# Patient Record
Sex: FEMALE | Race: WHITE | HISPANIC OR LATINO | Employment: FULL TIME | URBAN - METROPOLITAN AREA
[De-identification: names, ages, dates, MRNs, and addresses within clinical notes are randomized per-mention and may not be internally consistent; named-entity substitution may affect disease eponyms.]

---

## 2017-03-15 ENCOUNTER — TELEPHONE (OUTPATIENT)
Dept: PEDIATRICS | Facility: CLINIC | Age: 47
End: 2017-03-15

## 2017-03-15 DIAGNOSIS — A60.00 RECURRENT GENITAL HERPES: ICD-10-CM

## 2017-03-15 RX ORDER — ACYCLOVIR 400 MG/1
400 TABLET ORAL 3 TIMES DAILY
Qty: 15 TABLET | Refills: 3 | Status: SHIPPED | OUTPATIENT
Start: 2017-03-15 | End: 2018-05-10

## 2017-03-15 NOTE — TELEPHONE ENCOUNTER
acyclovir     Last Written Prescription Date: 12/19/16  Last Fill Quantity: 15, # refills: 3  Last Office Visit with [facility], [facility] or [facility] prescribing provider: 12/19/16        Creatinine   Date Value Ref Range Status   11/17/2016 0.69 0.52 - 1.04 mg/dL Final     Medication filled per protocol.      Snehal Vernon RN, [facility]

## 2017-07-03 DIAGNOSIS — F41.1 GAD (GENERALIZED ANXIETY DISORDER): ICD-10-CM

## 2017-07-03 NOTE — TELEPHONE ENCOUNTER
FLUoxetine HCl, PMDD, 20 MG CAPS  Last Written Prescription Date: 12/19/16  Last Fill Quantity: 90 capsules; # refills: 1  Last Office Visit with [facility], [facility] or [facility] prescribing provider:  12/19/16       Last PHQ-9 score on record=   PHQ-9 SCORE 8/17/2015   Total Score 3       Lab Results   Component Value Date    AST 23 04/13/2016     Lab Results   Component Value Date    ALT 46 04/13/2016     MG refill brandon [facility]

## 2018-01-21 ENCOUNTER — HEALTH MAINTENANCE LETTER (OUTPATIENT)
Age: 48
End: 2018-01-21

## 2018-05-10 DIAGNOSIS — A60.00 RECURRENT GENITAL HERPES: ICD-10-CM

## 2018-05-10 NOTE — LETTER
May 15, 2018      Domitila Garduno  [address] [facility] [address]              Dear Domitila,      We recently received a call from your pharmacy requesting a refill of your medication. We have provided a 30 day refill of your medication, acyclovir (ZOVIRAX) 400 MG tablet, as requested.      A review of your chart indicates that your last office visit was on 12/19/2016 with Dr. Ko.  An appointment is required yearly with your provider.    We have authorized a one time refill of your medication to allow time for you to schedule.     If you have a history of diabetes or high cholesterol, please come in fasting for the appointment. Fasting entails nothing to eat or drink 8 hours prior to your appointment; with the exception on water. You may take your medication the day of the appointment.    Please call the clinic to schedule your appointment.    Thank you for taking an active role in your healthcare.      Sincerely,     [facility]

## 2018-05-15 RX ORDER — ACYCLOVIR 400 MG/1
TABLET ORAL
Qty: 15 TABLET | Refills: 0 | Status: SHIPPED | OUTPATIENT
Start: 2018-05-15 | End: 2023-05-03

## 2018-05-15 NOTE — TELEPHONE ENCOUNTER
acyclovir (ZOVIRAX) 400 MG tablet 15 tablet 3 3/15/2017  No   Sig: Take 1 tablet (400 mg) by mouth 3 times daily     Last OV with Dr. Ko: 12/19/2016    No future apts.     Creatinine   Date Value Ref Range Status   11/17/2016 0.69 0.52 - 1.04 mg/dL Final     Antivirals for Herpes Protocol Failed5/10 11:05 AM   Recent (12 mo) or future (30 days) visit within the authorizing provider's specialty    Normal serum creatinine on file in past 12 months    Patient is age 12 or older     Patient due for annual physical and labs. [facility] refill. Patient notified by letter. Christiane Thapa RN

## 2019-03-04 ENCOUNTER — TELEPHONE (OUTPATIENT)
Dept: SURGERY | Facility: CLINIC | Age: 49
End: 2019-03-04

## 2019-03-04 NOTE — TELEPHONE ENCOUNTER
Reason for call:  Other   Patient called regarding (reason for call): needs a new card  Additional comments: Patient lost her meal card for when she goes to restaurants. She would like a new one mailed to her as soon as possible, to the address on file.    Phone number to reach patient:  Home number on file [phone] (home)    Best Time:  Anytime    Can we leave a detailed message on this number?  Not Applicable

## 2020-02-23 ENCOUNTER — HEALTH MAINTENANCE LETTER (OUTPATIENT)
Age: 50
End: 2020-02-23

## 2020-12-06 ENCOUNTER — HEALTH MAINTENANCE LETTER (OUTPATIENT)
Age: 50
End: 2020-12-06

## 2021-04-11 ENCOUNTER — HEALTH MAINTENANCE LETTER (OUTPATIENT)
Age: 51
End: 2021-04-11

## 2021-09-26 ENCOUNTER — HEALTH MAINTENANCE LETTER (OUTPATIENT)
Age: 51
End: 2021-09-26

## 2022-05-05 LAB — PAP-ABSTRACT: NORMAL

## 2022-05-07 ENCOUNTER — HEALTH MAINTENANCE LETTER (OUTPATIENT)
Age: 52
End: 2022-05-07

## 2022-11-01 LAB — HEMOCCULT STL QL IA: NEGATIVE

## 2022-11-21 ENCOUNTER — TRANSFERRED RECORDS (OUTPATIENT)
Dept: MULTI SPECIALTY CLINIC | Facility: CLINIC | Age: 52
End: 2022-11-21

## 2023-01-08 ENCOUNTER — HEALTH MAINTENANCE LETTER (OUTPATIENT)
Age: 53
End: 2023-01-08

## 2023-04-17 ENCOUNTER — OFFICE VISIT (OUTPATIENT)
Dept: FAMILY MEDICINE | Facility: CLINIC | Age: 53
End: 2023-04-17
Payer: COMMERCIAL

## 2023-04-17 VITALS
WEIGHT: 163.9 LBS | TEMPERATURE: 98.4 F | DIASTOLIC BLOOD PRESSURE: 90 MMHG | SYSTOLIC BLOOD PRESSURE: 160 MMHG | HEART RATE: 51 BPM | BODY MASS INDEX: 27.98 KG/M2 | OXYGEN SATURATION: 97 % | HEIGHT: 64 IN

## 2023-04-17 DIAGNOSIS — Z11.59 NEED FOR HEPATITIS C SCREENING TEST: ICD-10-CM

## 2023-04-17 DIAGNOSIS — G47.33 OSA (OBSTRUCTIVE SLEEP APNEA): ICD-10-CM

## 2023-04-17 DIAGNOSIS — Z86.79 HISTORY OF HYPERTENSION: ICD-10-CM

## 2023-04-17 DIAGNOSIS — Z86.69 HX OF CARPAL TUNNEL SYNDROME: ICD-10-CM

## 2023-04-17 DIAGNOSIS — Z98.84 S/P GASTRIC BYPASS: ICD-10-CM

## 2023-04-17 DIAGNOSIS — R42 DIZZINESS: Primary | ICD-10-CM

## 2023-04-17 LAB
ALBUMIN SERPL-MCNC: 3.6 G/DL (ref 3.4–5)
ALP SERPL-CCNC: 96 U/L (ref 40–150)
ALT SERPL W P-5'-P-CCNC: 76 U/L (ref 0–50)
ANION GAP SERPL CALCULATED.3IONS-SCNC: 3 MMOL/L (ref 3–14)
AST SERPL W P-5'-P-CCNC: 45 U/L (ref 0–45)
BILIRUB SERPL-MCNC: 0.3 MG/DL (ref 0.2–1.3)
BUN SERPL-MCNC: 14 MG/DL (ref 7–30)
CALCIUM SERPL-MCNC: 9 MG/DL (ref 8.5–10.1)
CHLORIDE BLD-SCNC: 105 MMOL/L (ref 94–109)
CO2 SERPL-SCNC: 29 MMOL/L (ref 20–32)
CREAT SERPL-MCNC: 0.7 MG/DL (ref 0.52–1.04)
CREAT UR-MCNC: 58 MG/DL
ERYTHROCYTE [DISTWIDTH] IN BLOOD BY AUTOMATED COUNT: 12.3 % (ref 10–15)
FERRITIN SERPL-MCNC: 45 NG/ML (ref 8–252)
GFR SERPL CREATININE-BSD FRML MDRD: >90 ML/MIN/1.73M2
GLUCOSE BLD-MCNC: 100 MG/DL (ref 70–99)
HCT VFR BLD AUTO: 41.2 % (ref 35–47)
HGB BLD-MCNC: 13.5 G/DL (ref 11.7–15.7)
MCH RBC QN AUTO: 29.6 PG (ref 26.5–33)
MCHC RBC AUTO-ENTMCNC: 32.8 G/DL (ref 31.5–36.5)
MCV RBC AUTO: 90 FL (ref 78–100)
MICROALBUMIN UR-MCNC: <5 MG/L
MICROALBUMIN/CREAT UR: NORMAL MG/G{CREAT}
PLATELET # BLD AUTO: 258 10E3/UL (ref 150–450)
POTASSIUM BLD-SCNC: 4 MMOL/L (ref 3.4–5.3)
PROT SERPL-MCNC: 7.2 G/DL (ref 6.8–8.8)
RBC # BLD AUTO: 4.56 10E6/UL (ref 3.8–5.2)
SODIUM SERPL-SCNC: 137 MMOL/L (ref 133–144)
TSH SERPL DL<=0.005 MIU/L-ACNC: 2.08 MU/L (ref 0.4–4)
VIT B12 SERPL-MCNC: 1340 PG/ML (ref 232–1245)
WBC # BLD AUTO: 5.7 10E3/UL (ref 4–11)

## 2023-04-17 PROCEDURE — 82043 UR ALBUMIN QUANTITATIVE: CPT | Performed by: FAMILY MEDICINE

## 2023-04-17 PROCEDURE — 90715 TDAP VACCINE 7 YRS/> IM: CPT | Performed by: FAMILY MEDICINE

## 2023-04-17 PROCEDURE — 80053 COMPREHEN METABOLIC PANEL: CPT | Performed by: FAMILY MEDICINE

## 2023-04-17 PROCEDURE — 85027 COMPLETE CBC AUTOMATED: CPT | Performed by: FAMILY MEDICINE

## 2023-04-17 PROCEDURE — 99204 OFFICE O/P NEW MOD 45 MIN: CPT | Mod: 25 | Performed by: FAMILY MEDICINE

## 2023-04-17 PROCEDURE — 82607 VITAMIN B-12: CPT | Performed by: FAMILY MEDICINE

## 2023-04-17 PROCEDURE — 36415 COLL VENOUS BLD VENIPUNCTURE: CPT | Performed by: FAMILY MEDICINE

## 2023-04-17 PROCEDURE — 82570 ASSAY OF URINE CREATININE: CPT | Performed by: FAMILY MEDICINE

## 2023-04-17 PROCEDURE — 82306 VITAMIN D 25 HYDROXY: CPT | Performed by: FAMILY MEDICINE

## 2023-04-17 PROCEDURE — 90471 IMMUNIZATION ADMIN: CPT | Performed by: FAMILY MEDICINE

## 2023-04-17 PROCEDURE — 82728 ASSAY OF FERRITIN: CPT | Performed by: FAMILY MEDICINE

## 2023-04-17 PROCEDURE — 84443 ASSAY THYROID STIM HORMONE: CPT | Performed by: FAMILY MEDICINE

## 2023-04-17 PROCEDURE — 86803 HEPATITIS C AB TEST: CPT | Performed by: FAMILY MEDICINE

## 2023-04-17 NOTE — Clinical Note
Please abstract the following data from this visit with this patient into the appropriate field in Epic:  Tests that can be patient reported without a hard copy:  Mammogram done on this date: 11/4/22 (approximately), by this group: [facility], results were negative. , Pap smear done on this date: 5/5/2022 (approximately), by this group: [facility], results were negative and normal. , and stool fit testing-11/1/22-negative  Other Tests found in the patient's chart through Chart Review/Care Everywhere:    Note to Abstraction: If this section is blank, no results were found via Chart Review/Care Everywhere.

## 2023-04-17 NOTE — PROGRESS NOTES
Assessment & Plan     Dizziness  BP Readings from Last 6 Encounters:   04/17/23 (!) 160/90   12/19/16 124/77   09/29/16 113/65   08/18/16 122/78   07/08/16 124/78   05/27/16 116/76     Initial blood pressures were 154/93, 161/91.  Rechecked-160/90  I suspect her current dizziness could be from underlying hypertension  Patient had history of hypertension and hyperlipidemia prior to her gastric bypass surgery and was on treatment with diuretic for hypertension  This was stopped after her significant weight loss following the gastric bypass in 2015  She is currently not checking blood pressures  We will check labs today including vitamin B12 that she is not taking currently due to over replacement of vitamin B12 as of outside labs from 11/2022  Recommended to start taking blood pressure 1-2 times a day for the next 2 weeks, follow-up in 2 weeks at the time of physical  Consider starting on antihypertensives  for persistent or worsening concerns  Will follow low salt diet, continue with weight loss efforts  and regular exercises.  Patient verbalised understanding and is agreeable to the plan.      - CBC with platelets; Future  - Comprehensive metabolic panel (BMP + Alb, Alk Phos, ALT, AST, Total. Bili, TP); Future  - Ferritin; Future  - Vitamin B12; Future  - TSH with free T4 reflex; Future  - CBC with platelets  - Comprehensive metabolic panel (BMP + Alb, Alk Phos, ALT, AST, Total. Bili, TP)  - Ferritin  - Vitamin B12  - TSH with free T4 reflex    History of hypertension  as above    - CBC with platelets; Future  - Comprehensive metabolic panel (BMP + Alb, Alk Phos, ALT, AST, Total. Bili, TP); Future  - Albumin Random Urine Quantitative with Creat Ratio; Future  - TSH with free T4 reflex; Future  - CBC with platelets  - Comprehensive metabolic panel (BMP + Alb, Alk Phos, ALT, AST, Total. Bili, TP)  - Albumin Random Urine Quantitative with Creat Ratio  - TSH with free T4 reflex    S/P gastric bypass  as above  Patient  "is currently taking vitamin D 4000 units daily, and iron supplements-2 tablets a day  - CBC with platelets; Future  - Ferritin; Future  - Vitamin B12; Future  - Vitamin D Deficiency; Future  - TSH with free T4 reflex; Future  - CBC with platelets  - Ferritin  - Vitamin B12  - Vitamin D Deficiency  - TSH with free T4 reflex    Need for hepatitis C screening test    - Hepatitis C Screen Reflex to HCV RNA Quant and Genotype; Future  - Hepatitis C Screen Reflex to HCV RNA Quant and Genotype    KANDIS (obstructive sleep apnea)  Patient reported using a mouthpiece      Hx of carpal tunnel syndrome  Comment:   Plan: Orthopedic  Referral        If B12 levels are in normal range, will call to schedule for sports medicine consult for evaluation for carpal tunnel syndrome          Review of the result(s) of each unique test - Mammogram from #2022, Pap smear from 5/2022, negative FIT from 112022 through Care Everywhere         BMI:   Estimated body mass index is 28.13 kg/m  as calculated from the following:    Height as of this encounter: 1.626 m (5' 4\").    Weight as of this encounter: 74.3 kg (163 lb 14.4 oz).   Weight management plan: Continue with weight loss efforts, healthy eating and regular exercises    Regular exercise  Chart documentation done in part with Dragon Voice recognition Software. Although reviewed after completion, some word and grammatical error may remain.    See Patient Instructions    Marcos Ko MD  [facility]   Domitila is a 53 year old, presenting for the following health issues:  Patient is here to reestablish care with this writer after recent change in insurance  Patient was last seen by me in 2016  She was being followed up with internal medicine at [facility] up until recent insurance change  Patient is here with concerns of having ongoing intermittent episodes of dizziness for the past few months associated with fatigue  She " has a history of status post gastric bypass in 2015 and abdominal wall plasty in [place] in December 2022  Patient has been taking over-the-counter iron and vitamin D supplements   She stopped her vitamin B12 supplements after having an elevated B12 lab marker from November 2022  Patient has been noticing intermittent episodes of tingling and numbness of both upper extremities and is wondering about carpal tunnel syndrome,  Denies personal history of anemia, thyroid disorder  Had history of type 2 diabetes before her gastric bypass but not anymore  Patient had history of hypertension before her gastric bypass surgery, was on Tenoretic  After significant weight loss following surgery, her statin and antihypertensives were discontinued   Denies chest pain, SOB, edema legs, visual concerns, focal neurological symptoms, syncope, palpitations.  She does have underlying history of sleep apnea, last sleep study was last year, is currently using mouthpiece  Denies abnormal bleeding problems, LMP-2 months ago      Referral (For carpal tunnel ), Fatigue, and Dizziness        4/17/2023     2:05 PM   Additional Questions   Roomed by Naima   Accompanied by Self         4/17/2023     2:05 PM   Patient Reported Additional Medications   Patient reports taking the following new medications None     History of Present Illness       Reason for visit:  [place], Blood pressure,referral to hand specialis  Symptom onset:  More than a month  Symptoms include:  Blood pressuru, al tireness,needreferall t hand specialist  Symptom intensity:  Moderate  Symptom progression:  Worsening  Had these symptoms before:  Yes  Has tried/received treatment for these symptoms:  Yes  Previous treatment was successful:  No  What makes it worse:  Lack of sleep  What makes it better:  Rest    She eats 4 or more servings of fruits and vegetables daily.She consumes 6 sweetened beverage(s) daily.She exercises with enough effort to increase her heart rate 30 to  "60 minutes per day.  She exercises with enough effort to increase her heart rate 3 or less days per week.   She is taking medications regularly.       Dizziness  Onset/Duration: on and off for a few months  Description:   Do you feel faint: No  Does it feel like the surroundings (bed, room) are moving: No  Unsteady/off balance: No  Have you passed out or fallen: No  Intensity: mild  Progression of Symptoms: same  Accompanying Signs & Symptoms:  Heart palpitations or chest pain: unsure  Nausea, vomiting: No  Weakness or lack of coordination in arms or legs: No  Vision or speech changes: YES  Numbness or tingling: YES- arms  Ringing in ears (Tinnitus): No  Hearing Loss: No  History:   Head trauma/concussion history: YES  Previous similar symptoms: No  Recent bleeding history: No  Any new medications (BP?): No  Precipitating factors:   Worse with activity: YES, when things around her are moving  Worse with head movement: No  Alleviating factors:   Does staying in a fixed position give relief: YES  Therapies tried and outcome: None          Review of Systems   CONSTITUTIONAL: NEGATIVE for fever, chills, change in weight  INTEGUMENTARY/SKIN: NEGATIVE for worrisome rashes, moles or lesions  EYES: NEGATIVE for vision changes or irritation  RESP: NEGATIVE for significant cough or SOB  CV: NEGATIVE for chest pain, palpitations or peripheral edema  CV: History of hypertension in the past  GI: NEGATIVE for nausea, abdominal pain, heartburn, or change in bowel habits  S/p gastric bypass  : Perimenopausal  MUSCULOSKELETAL: NEGATIVE for significant arthralgias or myalgia  NEURO: Tingling and numbness of upper extremities  ENDOCRINE: NEGATIVE for temperature intolerance, skin/hair changes  HEME/ALLERGY/IMMUNE: NEGATIVE for bleeding problems  PSYCHIATRIC: NEGATIVE for changes in mood or affect      Objective    BP (!) 160/90   Pulse 51   Temp 98.4  F (36.9  C) (Oral)   Ht 1.626 m (5' 4\")   Wt 74.3 kg (163 lb 14.4 oz)   SpO2 " 97%   BMI 28.13 kg/m    Body mass index is 28.13 kg/m .  Physical Exam   GENERAL: healthy, alert and no distress  EYES: Eyes grossly normal to inspection  HENT: oropharynx clear and oral mucous membranes moist  RESP: lungs clear to auscultation - no rales, rhonchi or wheezes  CV: regular rate and rhythm, normal S1 S2, no S3 or S4, no murmur, click or rub, no peripheral edema and peripheral pulses strong  MS: no gross musculoskeletal defects noted, no edema  NEURO: Normal strength and tone, mentation intact and speech normal  PSYCH: mentation appears normal, affect normal/bright    Labs-in process

## 2023-04-17 NOTE — NURSING NOTE
Prior to immunization administration, verified patients identity using patient s name and date of birth. Please see Immunization Activity for additional information. Yes    Screening Questionnaire for Adult Immunization    Are you sick today?   No   Do you have allergies to medications, food, a vaccine component or latex?   No   Have you ever had a serious reaction after receiving a vaccination?   No   Do you have a long-term health problem with heart, lung, kidney, or metabolic disease (e.g., diabetes), asthma, a blood disorder, no spleen, complement component deficiency, a cochlear implant, or a spinal fluid leak?  Are you on long-term aspirin therapy?   No   Do you have cancer, leukemia, HIV/AIDS, or any other immune system problem?   No   Do you have a parent, brother, or sister with an immune system problem?   No   In the past 3 months, have you taken medications that affect  your immune system, such as prednisone, other steroids, or anticancer drugs; drugs for the treatment of rheumatoid arthritis, Crohn s disease, or psoriasis; or have you had radiation treatments?   No   Have you had a seizure, or a brain or other nervous system problem?   No   During the past year, have you received a transfusion of blood or blood    products, or been given immune (gamma) globulin or antiviral drug?   No   For women: Are you pregnant or is there a chance you could become       pregnant during the next month?   No   Have you received any vaccinations in the past 4 weeks?   No     Immunization questionnaire answers were all negative.      Injection of TDAP given by Diane L. Schoenherr, RN. Patient instructed to remain in clinic for 15 minutes afterwards, and to report any adverse reactions.     Screening performed by Diane L. Schoenherr, RN on 4/17/2023 at 2:46 PM.

## 2023-04-18 LAB
DEPRECATED CALCIDIOL+CALCIFEROL SERPL-MC: 81 UG/L (ref 20–75)
HCV AB SERPL QL IA: NONREACTIVE

## 2023-04-19 NOTE — RESULT ENCOUNTER NOTE
Mahendra Ramesh,  Your recent labs showed normal blood counts and hemoglobin with no concern for anemia, normal iron levels, thyroid functions, urine exam with no protein leak and negative hepatitis C screening test.  These are good and reassuring  Your vitamin B12 continues to be moderately elevated, hold your B12 supplements for another 6 months  Your vitamin D levels are also in excess, decrease your vitamin D to 50% of what you have been taking now   Let me know if you have any questions. Take care.  Marcos Ko MD

## 2023-04-21 NOTE — TELEPHONE ENCOUNTER
DIAGNOSIS: bilat CTS   APPOINTMENT DATE: 05/05/2023   NOTES STATUS DETAILS   OFFICE NOTE from referring provider Internal 04/17/2023 Dr Ko [facility]    OFFICE NOTE from other specialist N/A    DISCHARGE SUMMARY from hospital N/A    DISCHARGE REPORT from the ER N/A    OPERATIVE REPORT N/A    EMG report N/A    MEDICATION LIST N/A    MRI N/A    DEXA (osteoporosis/bone health) N/A    CT SCAN N/A    XRAYS (IMAGES & REPORTS) N/A

## 2023-05-03 ENCOUNTER — OFFICE VISIT (OUTPATIENT)
Dept: FAMILY MEDICINE | Facility: CLINIC | Age: 53
End: 2023-05-03
Payer: COMMERCIAL

## 2023-05-03 ENCOUNTER — LAB (OUTPATIENT)
Dept: FAMILY MEDICINE | Facility: CLINIC | Age: 53
End: 2023-05-03

## 2023-05-03 VITALS
OXYGEN SATURATION: 98 % | BODY MASS INDEX: 28.27 KG/M2 | RESPIRATION RATE: 15 BRPM | SYSTOLIC BLOOD PRESSURE: 116 MMHG | WEIGHT: 164.7 LBS | DIASTOLIC BLOOD PRESSURE: 80 MMHG | HEART RATE: 60 BPM | TEMPERATURE: 96.8 F

## 2023-05-03 DIAGNOSIS — G43.709 CHRONIC MIGRAINE WITHOUT AURA WITHOUT STATUS MIGRAINOSUS, NOT INTRACTABLE: ICD-10-CM

## 2023-05-03 DIAGNOSIS — Z13.220 SCREENING FOR HYPERLIPIDEMIA: ICD-10-CM

## 2023-05-03 DIAGNOSIS — Z98.84 S/P GASTRIC BYPASS: ICD-10-CM

## 2023-05-03 DIAGNOSIS — Z12.11 COLON CANCER SCREENING: ICD-10-CM

## 2023-05-03 DIAGNOSIS — G47.33 OSA (OBSTRUCTIVE SLEEP APNEA): ICD-10-CM

## 2023-05-03 DIAGNOSIS — F41.1 GAD (GENERALIZED ANXIETY DISORDER): ICD-10-CM

## 2023-05-03 DIAGNOSIS — Z12.4 CERVICAL CANCER SCREENING: ICD-10-CM

## 2023-05-03 DIAGNOSIS — R53.82 CHRONIC FATIGUE: ICD-10-CM

## 2023-05-03 DIAGNOSIS — R25.1 TREMOR: ICD-10-CM

## 2023-05-03 DIAGNOSIS — Z00.00 ROUTINE GENERAL MEDICAL EXAMINATION AT A HEALTH CARE FACILITY: Primary | ICD-10-CM

## 2023-05-03 DIAGNOSIS — R07.89 CHEST PRESSURE: ICD-10-CM

## 2023-05-03 DIAGNOSIS — Z86.69 HX OF CARPAL TUNNEL SYNDROME: ICD-10-CM

## 2023-05-03 DIAGNOSIS — Z86.79 HISTORY OF HYPERTENSION: ICD-10-CM

## 2023-05-03 DIAGNOSIS — Z12.31 ENCOUNTER FOR SCREENING MAMMOGRAM FOR MALIGNANT NEOPLASM OF BREAST: ICD-10-CM

## 2023-05-03 DIAGNOSIS — A60.00 RECURRENT GENITAL HERPES: ICD-10-CM

## 2023-05-03 PROCEDURE — 93000 ELECTROCARDIOGRAM COMPLETE: CPT | Performed by: FAMILY MEDICINE

## 2023-05-03 PROCEDURE — 99396 PREV VISIT EST AGE 40-64: CPT | Performed by: FAMILY MEDICINE

## 2023-05-03 PROCEDURE — 99214 OFFICE O/P EST MOD 30 MIN: CPT | Mod: 25 | Performed by: FAMILY MEDICINE

## 2023-05-03 RX ORDER — PROPRANOLOL HYDROCHLORIDE 20 MG/1
20 TABLET ORAL
Qty: 90 TABLET | Refills: 3 | Status: SHIPPED | OUTPATIENT
Start: 2023-05-03 | End: 2023-12-13

## 2023-05-03 RX ORDER — SUMATRIPTAN 25 MG/1
25 TABLET, FILM COATED ORAL
Qty: 18 TABLET | Refills: 1 | Status: SHIPPED | OUTPATIENT
Start: 2023-05-03 | End: 2023-12-13

## 2023-05-03 RX ORDER — VALACYCLOVIR HYDROCHLORIDE 500 MG/1
500 TABLET, FILM COATED ORAL DAILY
Qty: 90 TABLET | Refills: 3 | Status: SHIPPED | OUTPATIENT
Start: 2023-05-03 | End: 2023-11-08

## 2023-05-03 RX ORDER — FAMOTIDINE 20 MG
2000 TABLET ORAL EVERY MORNING
COMMUNITY
Start: 2023-05-03

## 2023-05-03 ASSESSMENT — ENCOUNTER SYMPTOMS
PARESTHESIAS: 0
DIZZINESS: 1
BREAST MASS: 0
FREQUENCY: 0
MYALGIAS: 0
DYSURIA: 0
HEARTBURN: 0
HEADACHES: 1
COUGH: 0
FEVER: 0
HEMATOCHEZIA: 0
PALPITATIONS: 0
JOINT SWELLING: 1
HEMATURIA: 0
DIARRHEA: 0
NAUSEA: 0
SHORTNESS OF BREATH: 1
SORE THROAT: 0
NERVOUS/ANXIOUS: 0
CHILLS: 0
ABDOMINAL PAIN: 0
CONSTIPATION: 0
ARTHRALGIAS: 1
EYE PAIN: 0

## 2023-05-03 ASSESSMENT — PAIN SCALES - GENERAL: PAINLEVEL: NO PAIN (0)

## 2023-05-03 NOTE — PROGRESS NOTES
Answers for HPI/ROS submitted by the patient on 5/3/2023  Frequency of exercise:: 4-5 days/week  Getting at least 3 servings of Calcium per day:: Yes  Diet:: Low salt  Taking medications regularly:: Yes  Medication side effects:: None  Bi-annual eye exam:: Yes  Dental care twice a year:: Yes  Sleep apnea or symptoms of sleep apnea:: Sleep apnea  abdominal pain: No  Blood in stool: No  Blood in urine: No  chest pain: No  chills: No  congestion: No  constipation: No  cough: No  diarrhea: No  dizziness: Yes  ear pain: No  eye pain: No  nervous/anxious: No  fever: No  frequency: No  genital sores: No  headaches: Yes  hearing loss: No  heartburn: No  arthralgias: Yes  joint swelling: Yes  mood changes: No  myalgias: No  nausea: No  dysuria: No  palpitations: No  Skin sensation changes: No  sore throat: No  urgency: No  rash: No  shortness of breath: Yes  pelvic pain: No  vaginal discharge: No  tenderness: No  breast mass: No  breast discharge: No  Additional concerns today:: Yes  Duration of exercise:: 45-60 minutes       SUBJECTIVE:   CC: Domitila is an 53 year old who presents for preventive health visit.     Patient is here for annual physical and with other concerns as mentioned below  Chest pressure-patient had history of hypertension in the past, was on antihypertensives but was stopped after she had her gastric bypass surgery  At her last visit 2 weeks ago, patient's blood pressure was found to be significantly elevated she has been checking blood pressures at home,most of which are moderately elevated  Patient is also concerned with having concerns for headache, mild dizziness and chest pressure when she notes elevated blood pressures  Today's blood pressure in the office is within normal range  Patient is using a wrist monitor, which showed 122/83 at home this morning at 8:00 and 147/93 during the office visit today  Patient denies having dyspnea on exertion, shortness of breath, GERD when she feels chest  pressure.  She continues to worry about having hypertension  Chest pressure last for few seconds, resolves on its own  Denies chest pain  Patient does not smoke, does not use alcohol, recreational drugs  Has family history of heart disease  Patient does not have any limitation of routine activities because of the symptoms  Past medical history significant for status post gastric bypass, obstructive sleep apnea, carpal tunnel syndrome, recurrent genital herpes, anxiety, chronic migraine headaches, tremor  Patient is also concerned with ongoing unexplained fatigue for many years  It was noted that patient has been taking propanolol 20 mg daily once in the morning for her anxiety and tremor  Patient recently had labs done which showed no concerns for iron deficiency, anemia, vitamin deficiencies      4/17/2023     2:05 PM   Additional Questions   Roomed by Naima   Accompanied by Self   Patient has been advised of split billing requirements and indicates understanding: Yes  Healthy Habits:     Getting at least 3 servings of Calcium per day:  Yes    Bi-annual eye exam:  Yes    Dental care twice a year:  Yes    Sleep apnea or symptoms of sleep apnea:  Sleep apnea    Diet:  Low salt    Frequency of exercise:  4-5 days/week    Duration of exercise:  45-60 minutes    Taking medications regularly:  Yes    Medication side effects:  None    PHQ-2 Total Score: 0    Additional concerns today:  Yes          Medication Followup of Valtrex for recurrent genital herpes, propranolol for anxiety and tremor    Taking Medication as prescribed: yes    Side Effects:  None    Medication Helping Symptoms:  yes    Anxiety Follow-Up    How are you doing with your anxiety since your last visit? No change    Are you having other symptoms that might be associated with anxiety? No    Have you had a significant life event? No     Are you feeling depressed? No    Do you have any concerns with your use of alcohol or other drugs? No    Social History      Tobacco Use     Smoking status: Never     Smokeless tobacco: Never   Vaping Use     Vaping status: Never Used   Substance Use Topics     Alcohol use: No     Drug use: No         2/24/2014     9:14 AM 8/17/2015     1:57 PM   TERRANCE-7 SCORE   Total Score 3 3          View : No data to display.                  Migraine     Since your last clinic visit, how have your headaches changed?  No change    How often are you getting headaches or migraines?  Rarely few times a year,    Are you able to do normal daily activities when you have a migraine? Yes    Are you taking rescue/relief medications? (Select all that apply) sumatriptan (Imitrex)    How helpful is your rescue/relief medication?  I get total relief    Are you taking any medications to prevent migraines? (Select all that apply)  Inderal    In the past 4 weeks, how often have you gone to urgent care or the emergency room because of your headaches?  0    Pt noticed discomforting pressure on chest for a few weeks, pt would like to discuss.    Today's PHQ-2 Score:       5/3/2023     9:08 AM   PHQ-2 ( 1999 [employer])   Q1: Little interest or pleasure in doing things 0   Q2: Feeling down, depressed or hopeless 0   PHQ-2 Score 0   Q1: Little interest or pleasure in doing things Not at all   Q2: Feeling down, depressed or hopeless Not at all   PHQ-2 Score 0       Have you ever done Advance Care Planning? (For example, a Health Directive, POLST, or a discussion with a medical provider or your loved ones about your wishes): No, advance care planning information given to patient to review.  Patient plans to discuss their wishes with loved ones or provider.      Social History     Tobacco Use     Smoking status: Never     Smokeless tobacco: Never   Vaping Use     Vaping status: Never Used   Substance Use Topics     Alcohol use: No             5/3/2023     9:08 AM   Alcohol Use   Prescreen: >3 drinks/day or >7 drinks/week? No          View : No data to display.               Reviewed orders with patient.  Reviewed health maintenance and updated orders accordingly - Yes  Lab work is in process  Labs reviewed in EPIC  BP Readings from Last 3 Encounters:   05/03/23 116/80   04/17/23 (!) 160/90   12/19/16 124/77    Wt Readings from Last 3 Encounters:   05/03/23 74.7 kg (164 lb 11.2 oz)   04/17/23 74.3 kg (163 lb 14.4 oz)   12/19/16 75 kg (165 lb 6.4 oz)                  Patient Active Problem List   Diagnosis     TERRANCE (generalized anxiety disorder)     S/P epigastric hernia repair     Hypertension goal BP (blood pressure) < 140/90     Migraine     Hypokalemia     Recurrent genital herpes     Snoring     Fatigue     Encounter for Essure implantation     Stress incontinence     Plantar fasciitis     Bariatric surgery status     Hx of LASIK     No diabetic retinopathy in both eyes     S/P gastric bypass     KANDIS (obstructive sleep apnea)     Hypertriglyceridemia     Knee pain, unspecified laterality     Stress     Tendinitis of right wrist     BMI 27.0-27.9,adult     Postsurgical malabsorption     H/O diabetes mellitus     Hyperlipidemia LDL goal <100     History of hypertension     Dizziness     Tremor     Past Surgical History:   Procedure Laterality Date     ABDOMINOPLASTY  12/16/2022     FOOT SURGERY       HERNIA REPAIR       LAPAROSCOPIC BYPASS GASTRIC N/A 05/28/2015    Procedure: LAPAROSCOPIC BYPASS GASTRIC;  Surgeon: Eulalio Loza MD;  Location: [facility] OR     LASIK Right      LIVER BIOPSY       [facility]ED[facility] UNLISTED         Social History     Tobacco Use     Smoking status: Never     Smokeless tobacco: Never   Vaping Use     Vaping status: Never Used   Substance Use Topics     Alcohol use: No     Family History   Problem Relation Age of Onset     Hypertension Maternal Grandmother      Cancer Sister         liver cancer     Thyroid Disease Sister      Thyroid Disease Mother      Cerebrovascular Disease Paternal Uncle      Glaucoma No family hx of      Macular  Degeneration No family hx of      Diabetes Paternal Grandmother      Diabetes Other          Current Outpatient Medications   Medication Sig Dispense Refill     Acetaminophen (TYLENOL PO) Take 325 mg by mouth every 6 hours as needed for mild pain or fever       Ascorbic Acid (VITAMIN C PO) Take 500 mg by mouth every morning        Calcium Carbonate-Vitamin D (CALCIUM + D PO) Take 600 mg by mouth 2 times daily (with meals)        Cyanocobalamin (B-12 PO) Take 1 drop by mouth every morning 1000 mcg per drop       diclofenac (VOLTAREN) 1 % GEL Apply 2 grams to elbows up to four times daily using enclosed dosing card. (Patient taking differently: as needed Apply 2 grams to elbows up to four times daily using enclosed dosing card.) 100 g 1     Ferrous Sulfate (IRON SUPPLEMENT PO) Take 65 mg by mouth every morning        FLUoxetine HCl, PMDD, 20 MG CAPS Take 1 capsule by mouth daily 90 capsule 2     Multiple Vitamins-Minerals (MULTIVITAMIN PO) Take 2 tablets by mouth every morning        order for DME Equipment being ordered: right wrist splint- 1 Units 0     propranolol (INDERAL) 20 MG tablet Take 1 tablet (20 mg) by mouth nightly as needed (tremor, anxiety,) 90 tablet 3     SUMAtriptan (IMITREX) 25 MG tablet Take 1 tablet (25 mg) by mouth at onset of headache for migraine May repeat in 2 hours. Max 8 tablets/24 hours. 18 tablet 1     valACYclovir (VALTREX) 500 MG tablet Take 1 tablet (500 mg) by mouth daily 90 tablet 3     Vitamin D, Cholecalciferol, 25 MCG (1000 UT) CAPS Take 2,000 Units by mouth every morning       Allergies   Allergen Reactions     Succinylcholine Other (See Comments)     Unable to wake after ankle surgery     Vicodin [Hydrocodone-Acetaminophen] Hives     Penicillins      Unknown reaction in childhood     Recent Labs   Lab Test 04/17/23  1456 12/19/16  1629 11/17/16  0724 04/13/16  1543 12/21/15  1615 10/09/15  0742 08/17/15  1403   A1C  --   --  5.3 5.6  --   --  5.9   LDL  --   --  90  --   --  32   --    HDL  --   --  63  --   --  41*  --    TRIG  --   --  126  --   --  186*  --    ALT 76*  --   --  46  --   --  45   CR 0.70  --  0.69 0.64  --   --  0.68   GFRESTIMATED >90  --  >90  Non  GFR Calc   >90  Non  GFR Calc    --   --  >90  Non  GFR Calc     GFRESTBLACK  --   --  >90   GFR Calc   >90   GFR Calc    --   --  >90  African American GFR Calc     POTASSIUM 4.0  --  3.9 3.6   < >  --  3.3*   TSH 2.08 1.79  --  2.12  --   --  1.60    < > = values in this interval not displayed.        Breast Cancer Screenin/3/2023     9:08 AM   Breast CA Risk Assessment (FHS-7)   Do you have a family history of breast, colon, or ovarian cancer? No / Unknown       click delete button to remove this line now  Mammogram Screening: Recommended annual mammography  Pertinent mammograms are reviewed under the imaging tab.    History of abnormal Pap smear: NO - age 30-65 PAP every 5 years with negative HPV co-testing recommended      Latest Ref Rng & Units 2016     4:17 PM 2016    12:00 AM   PAP / HPV   PAP (Historical)   NIL     HPV 16 DNA NEG Negative      HPV 18 DNA NEG Negative      Other HR HPV NEG Negative        Reviewed and updated as needed this visit by clinical staff   Tobacco  Allergies  Meds              Reviewed and updated as needed this visit by Provider                   Past Medical History:   Diagnosis Date     Diabetes mellitus      Fatigue 2015     Hypertension      Lateral epicondylitis 2013     Sleep apnea     uses cpap     Surgical complications       Past Surgical History:   Procedure Laterality Date     ABDOMINOPLASTY  2022     FOOT SURGERY       HERNIA REPAIR       LAPAROSCOPIC BYPASS GASTRIC N/A 2015    Procedure: LAPAROSCOPIC BYPASS GASTRIC;  Surgeon: Eulalio Loza MD;  Location:  OR     [facility] Right      LIVER BIOPSY       [facility] STOMACH SURGERY PROCEDURE UNLISTED       OB  History   No obstetric history on file.       Review of Systems   Constitutional: Negative for chills and fever.   HENT: Negative for congestion, ear pain, hearing loss and sore throat.    Eyes: Negative for pain.   Respiratory: Positive for shortness of breath. Negative for cough.    Cardiovascular: Negative for chest pain and palpitations.   Gastrointestinal: Negative for abdominal pain, constipation, diarrhea, heartburn, hematochezia and nausea.   Breasts:  Negative for tenderness, breast mass and discharge.   Genitourinary: Negative for dysuria, frequency, genital sores, hematuria, pelvic pain, urgency and vaginal discharge.   Musculoskeletal: Positive for arthralgias and joint swelling. Negative for myalgias.   Skin: Negative for rash.   Neurological: Positive for dizziness and headaches. Negative for paresthesias.   Psychiatric/Behavioral: Negative for mood changes. The patient is not nervous/anxious.      CONSTITUTIONAL: History of chronic fatigue  INTEGUMENTARY/SKIN: NEGATIVE for worrisome rashes, moles or lesions  EYES: NEGATIVE for vision changes or irritation  ENT: NEGATIVE for ear, mouth and throat problems  RESP: NEGATIVE for significant cough or SOB  BREAST: NEGATIVE for masses, tenderness or discharge  CV: History of chest pressure as mentioned above and Hx HTN  GI: NEGATIVE for nausea, abdominal pain, heartburn, or change in bowel habits  : NEGATIVE for unusual urinary or vaginal symptoms. No vaginal bleeding.  MUSCULOSKELETAL: NEGATIVE for significant arthralgias or myalgia  NEURO: NEGATIVE for weakness, dizziness or paresthesias  NEURO: Hx headaches-migraine  ENDOCRINE: NEGATIVE for temperature intolerance, skin/hair changes  HEME/ALLERGY/IMMUNE: NEGATIVE for bleeding problems  PSYCHIATRIC: NEGATIVE for changes in mood or affect  PSYCHIATRIC: HX anxiety      OBJECTIVE:   /80 (BP Location: Right arm, Patient Position: Sitting)   Pulse 60   Temp 96.8  F (36  C) (Tympanic)   Resp 15   Wt  74.7 kg (164 lb 11.2 oz)   SpO2 98%   BMI 28.27 kg/m    Physical Exam  GENERAL APPEARANCE: healthy, alert and no distress  EYES: Eyes grossly normal to inspection, PERRL and conjunctivae and sclerae normal  HENT: ear canals and TM's normal, nose and mouth without ulcers or lesions, oropharynx clear and oral mucous membranes moist  NECK: no adenopathy, no asymmetry, masses, or scars and thyroid normal to palpation  RESP: lungs clear to auscultation - no rales, rhonchi or wheezes  BREAST: normal without masses, tenderness or nipple discharge and no palpable axillary masses or adenopathy  CV: regular rate and rhythm, normal S1 S2, no S3 or S4, no murmur, click or rub, no peripheral edema and peripheral pulses strong  ABDOMEN: soft, nontender, no hepatosplenomegaly, no masses and bowel sounds normal  MS: no musculoskeletal defects are noted and gait is age appropriate without ataxia  SKIN: no suspicious lesions or rashes  NEURO: Normal strength and tone, sensory exam grossly normal, mentation intact and speech normal  PSYCH: mentation appears normal and affect normal/bright    Diagnostic Test Results:  Labs reviewed in Epic    ASSESSMENT/PLAN:   (Z00.00) Routine general medical examination at a health care facility  (primary encounter diagnosis)  Comment:   Plan: Discussed on regular exercises, daily calcium intake, healthy eating, self breast exams monthly and routine dental checks    (Z86.79) History of hypertension  Comment:   Plan: 24 Hour Blood Pressure Monitor - Adult        Reviewed moderately elevated home blood pressure readings  Today's blood pressure in the office was 116/80  I rechecked again manually that showed a blood pressure of 118/72  We count to check with patient's blood pressure monitor with a wrist cuff that showed 147/93  I would doubt the efficiency of this wrist monitor  Due to patient's previous history and ongoing symptoms and concerns for hypertension, would recommend a 24-hour blood  pressure monitoring for further evaluation  Will f/u on results and call with recommendations.      (R07.89) Chest pressure  Comment:   Plan: EKG 12-lead complete w/read - Clinics        Due to concerns for chest pressure with elevated blood pressure, recommended to get an EKG EKG was reviewed with patient that showed sinus bradycardia likely from current use of propanolol  Discussed about further cardiac work-up, patient wants to wait for now  We will evaluate hypertension as mentioned above  If chest symptoms continues to be a concern, patient understands to follow-up for further evaluation including consideration for echo and stress test  If symptoms get worse, she understands to be seen in the ER right away for further evaluation    (F41.1) TERRANCE (generalized anxiety disorder)  Comment:   Plan: FLUoxetine HCl, PMDD, 20 MG CAPS, propranolol         (INDERAL) 20 MG tablet        Stable, continue with current dose of fluoxetine, and propanolol   recheck in 6 months or sooner if needed      (A60.00) Recurrent genital herpes  Comment:   Plan: valACYclovir (VALTREX) 500 MG tablet        Patient reports taking Valtrex 500 mg once daily for chronic suppression, refills given today    (G43.709) Chronic migraine without aura without status migrainosus, not intractable  Comment:   Plan: propranolol (INDERAL) 20 MG tablet, SUMAtriptan        (IMITREX) 25 MG tablet        Stable, continue to avoid potential triggers for migraine, continue Inderal for prevention and Imitrex as needed for episodic migraine headaches    (R25.1) Tremor  Comment:   Plan: propranolol (INDERAL) 20 MG tablet        Patient was started on propanolol with the previous provider at [facility] for tremor    (R53.82) Chronic fatigue  Comment:   Plan: Patient is currently using mouthpiece for sleep apnea  Continue with healthy eating, regular exercises  Recommended to switch propanolol from the morning time to the evening time which I would expect  to improve her fatigue  Reviewed recent normal lab results from 4/17/2023 including thyroid functions, vitamin D, B12, ferritin, CBC and CMP    (Z98.84) S/P gastric bypass  Comment:   Plan: Continue with portion control, healthy eating, regular exercises    (G47.33) KANDIS (obstructive sleep apnea)  Comment: uses mouth piece  Plan: Continue to use mouthpiece    (Z86.69) Hx of carpal tunnel syndrome  Comment:   Plan: Continue to proceed with orthopedic consult for further evaluation    (Z13.220) Screening for hyperlipidemia  Comment:   Plan: Lipid panel reflex to direct LDL Fasting        Patient is not fasting today    (Z12.4) Cervical cancer screening  Comment:   Plan: Patient is not due for Pap until 2025    (Z12.31) Encounter for screening mammogram for malignant neoplasm of breast  Comment:   Plan: MA Screening Digital Bilateral            (Z12.11) Colon cancer screening  Comment:   Plan: COLOGUARD([employer]),       Patient declined colonoscopy            COUNSELING:  Reviewed preventive health counseling, as reflected in patient instructions  Special attention given to:        Regular exercise       Healthy diet/nutrition       Vision screening       Osteoporosis prevention/bone health       Colorectal Cancer Screening       (Katie)menopause management       The ASCVD Risk score (Jennifer DK, et al., 2019) failed to calculate for the following reasons:    Cannot find a previous HDL lab    Cannot find a previous total cholesterol lab        She reports that she has never smoked. She has never used smokeless tobacco.      Chart documentation done in part with Dragon Voice recognition Software. Although reviewed after completion, some word and grammatical error may remain.  Marcos Ko MD  [facility]

## 2023-05-05 ENCOUNTER — PRE VISIT (OUTPATIENT)
Dept: ORTHOPEDICS | Facility: CLINIC | Age: 53
End: 2023-05-05

## 2023-05-05 ENCOUNTER — OFFICE VISIT (OUTPATIENT)
Dept: ORTHOPEDICS | Facility: CLINIC | Age: 53
End: 2023-05-05
Attending: FAMILY MEDICINE
Payer: COMMERCIAL

## 2023-05-05 ENCOUNTER — ANCILLARY PROCEDURE (OUTPATIENT)
Dept: GENERAL RADIOLOGY | Facility: CLINIC | Age: 53
End: 2023-05-05
Attending: FAMILY MEDICINE
Payer: COMMERCIAL

## 2023-05-05 DIAGNOSIS — M25.522 BILATERAL ELBOW JOINT PAIN: ICD-10-CM

## 2023-05-05 DIAGNOSIS — M25.521 BILATERAL ELBOW JOINT PAIN: ICD-10-CM

## 2023-05-05 DIAGNOSIS — M79.642 BILATERAL HAND PAIN: Primary | ICD-10-CM

## 2023-05-05 DIAGNOSIS — M79.641 BILATERAL HAND PAIN: Primary | ICD-10-CM

## 2023-05-05 DIAGNOSIS — M79.641 BILATERAL HAND PAIN: ICD-10-CM

## 2023-05-05 DIAGNOSIS — Z86.69 HX OF CARPAL TUNNEL SYNDROME: ICD-10-CM

## 2023-05-05 DIAGNOSIS — M79.642 BILATERAL HAND PAIN: ICD-10-CM

## 2023-05-05 PROCEDURE — 73130 X-RAY EXAM OF HAND: CPT | Mod: RT | Performed by: RADIOLOGY

## 2023-05-05 PROCEDURE — 99204 OFFICE O/P NEW MOD 45 MIN: CPT | Performed by: FAMILY MEDICINE

## 2023-05-05 NOTE — PATIENT INSTRUCTIONS
Thanks for coming today.  [facility]  [address] [place] [address]    To schedule future appointments in Ortho Clinic, you may call [phone].    To schedule ordered imaging or an injection ordered by your provider:  Call Central Imaging Injection scheduling line: [phone]    MyChart available online at:  [email or web address]    Please call if any further questions or concerns ([phone]).  Clinic hours 8 am to 5 pm.    Return to clinic (call) if symptoms worsen or fail to improve.

## 2023-05-05 NOTE — PROGRESS NOTES
[facility]  SPORTS MEDICINE CLINIC VISIT     May 5, 2023        ASSESSMENT & PLAN    53-year-old with chronic bilateral wrist and elbow pain that is multifactorial.  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  This seems to be symptomatic for her at the current time.  Have recommended follow-up with hand therapy.  We discussed splinting with CMC brace.  May use topical diclofenac.  Discussed indication for steroid injection.  She also has some symptoms that sound consistent with carpal tunnel syndrome for this indication as well as for her medial elbow pain, we have recommended EMG for further evaluation.    2) elbow pain: Also multifactorial.  Is having considerable pain in the lateral epicondyles which is chronic.  Has demonstration of next extensor tendon tearing on MRI from 2016 of the left elbow.  We will pursue imaging of the right elbow today.  She would be interested in PRP versus Tenex for this condition.  However her medial elbow pain is equally if not more painful at the current time.  She does have tenderness over the medial epicondyle and symptoms consistent with medial epicondylitis though does have radiating pain that suggests ulnar nerve etiology.  We will pursue EMG, as above.  Additionally MRI imaging may give suggestion of underlying etiology of medial elbow pain    Reviewed imaging and assessment with patient in detail      Anupam Guerrero MD  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     The patient is seen by themselves.  The patient is Right  handed    Onset: years years(s) ago. Reports insidious onset without acute precipitating event.  Location of Pain: bilateral hands and elbows  Worsened by: typing, and baking, using her hands mostly  Better with: rest  Treatments tried: casting/splinting/bracing, tylenol and diclofenac  Associated symptoms: pain and swelling    Orthopedic/Surgical history: NO  Social History/Occupation: baker, child       REVIEW OF SYSTEMS:    Do you have fever, chills, weight loss? No    Do you have any vision problems? No    Do you have any chest pain or edema? No    Do you have any shortness of breath or wheezing?  No    Do you have stomach problems? No    Do you have any numbness or focal weakness? No    Do you have diabetes? No    Do you have problems with bleeding or clotting? No    Do you have an rashes or other skin lesions? No    OBJECTIVE:  There were no vitals taken for this visit.     General  - alert, pleasant, no distress  CV  - normal radial pulse, cap refill brisk  Musculoskeletal - wrist  - inspection: normal joint alignment, no obvious deformity, no swelling  - palpation: TTP over the first CMC bilaterally no bony or soft tissue tenderness, no tenderness at the anatomical snuffbox  - ROM:  90 deg flexion   70 deg extension   25 deg abduction   65 deg adduction  - strength: 5/5  strength, 5/5 flexion, extension, pronation, supination, adduction, abduction  - special tests:  (-) Tinel's  (-) Finkelstein  (+) Phalen  (-) Reyes click test  (-) ulnar impaction  Neuro  - no sensory or motor deficit, grossly normal coordination, normal muscle tone  Skin  - no ecchymosis, erythema, warmth, or induration, no obvious rash      General: Alert, pleasant, no distress  Bilateral elbow: warm, well perfused, SILT throughout     Inspection: No swelling ecchymosis or deformity     ROM: Symmetric and full     Strength: Has pain with wrist extension and wrist flexion against resistance     Palpation: TTP over the  medial and lateral epicondyles bilaterally.  TTP over the cubital tunnel as well     Special Tests: Has increased pain in medial elbow with passive elbow terminal flexion      RADIOLOGY:    3 view xrays of bilateral hands performed and reviewed independently demonstrating mild DJD in the first CMC bilaterally.  No acute fracture. See EMR for formal radiology report.       MRI left elbow dated 10/5/2016 is reviewed.  Per radiology report:  IMPRESSION:  1. High-grade partial-thickness tearing of the left elbow common  extensor tendon at its origin on the lateral humeral epicondyle,  findings consistent with lateral epicondylitis.  2. Thickening at the origin of the left elbow radial collateral  ligament. The lateral ulnar collateral ligament is not well seen.  3. Minimal fluid in the left elbow joint.  4. No marrow signal abnormalities to suggest fracture, osteonecrosis,  or stress changes.

## 2023-05-05 NOTE — LETTER
5/5/2023         RE: Domitila Luo  [address] [facility] [address]        Dear Colleague,    Thank you for referring your patient, Domitila Luo, to the [facility] SPORTS MEDICINE [facility]. Please see a copy of my visit note below.      [facility]  SPORTS MEDICINE CLINIC VISIT     May 5, 2023        ASSESSMENT & PLAN    53-year-old with chronic bilateral wrist and elbow pain that is multifactorial.  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  This seems to be symptomatic for her at the current time.  Have recommended follow-up with hand therapy.  We discussed splinting with CMC brace.  May use topical diclofenac.  Discussed indication for steroid injection.  She also has some symptoms that sound consistent with carpal tunnel syndrome for this indication as well as for her medial elbow pain, we have recommended EMG for further evaluation.    2) elbow pain: Also multifactorial.  Is having considerable pain in the lateral epicondyles which is chronic.  Has demonstration of next extensor tendon tearing on MRI from 2016 of the left elbow.  We will pursue imaging of the right elbow today.  She would be interested in PRP versus Tenex for this condition.  However her medial elbow pain is equally if not more painful at the current time.  She does have tenderness over the medial epicondyle and symptoms consistent with medial epicondylitis though does have radiating pain that suggests ulnar nerve etiology.  We will pursue EMG, as above.  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     The patient is seen by themselves.  The patient is Right handed    Onset: years years(s) ago. Reports insidious onset without acute precipitating event.  Location of Pain: bilateral hands and elbows  Worsened by: typing, and baking, using her hands mostly  Better with: rest  Treatments tried: casting/splinting/bracing, tylenol and diclofenac  Associated symptoms: pain and swelling    Orthopedic/Surgical history: NO  Social History/Occupation: baker, child       REVIEW OF SYSTEMS:  Do you have fever, chills, weight loss? No  Do you have any vision problems? No  Do you have any chest pain or edema? No  Do you have any shortness of breath or wheezing?  No  Do you have stomach problems? No  Do you have any numbness or focal weakness? No  Do you have diabetes? No  Do you have problems with bleeding or clotting? No  Do you have an rashes or other skin lesions? No    OBJECTIVE:  There were no vitals taken for this visit.     General  - alert, pleasant, no distress  CV  - normal radial pulse, cap refill brisk  Musculoskeletal - wrist  - inspection: normal joint alignment, no obvious deformity, no swelling  - palpation: TTP over the first CMC bilaterally no bony or soft tissue tenderness, no tenderness at the anatomical snuffbox  - ROM:  90 deg flexion   70 deg extension   25 deg abduction   65 deg adduction  - strength: 5/5  strength, 5/5 flexion, extension, pronation, supination, adduction, abduction  - special tests:  (-) Tinel's  (-) Finkelstein  (+) Phalen  (-) Reyes click test  (-) ulnar impaction  Neuro  - no sensory or motor deficit, grossly normal coordination, normal muscle tone  Skin  - no ecchymosis, erythema, warmth, or induration, no obvious rash      General: Alert, pleasant, no  distress  Bilateral elbow: warm, well perfused, SILT throughout     Inspection: No swelling ecchymosis or deformity     ROM: Symmetric and full     Strength: Has pain with wrist extension and wrist flexion against resistance     Palpation: TTP over the medial and lateral epicondyles bilaterally.  TTP over the cubital tunnel as well     Special Tests: Has increased pain in medial elbow with passive elbow terminal flexion      RADIOLOGY:    3 view xrays of bilateral hands performed and reviewed independently demonstrating mild DJD in the first CMC bilaterally.  No acute fracture. See EMR for formal radiology report.       MRI left elbow dated 10/5/2016 is reviewed.  Per radiology report:  IMPRESSION:  1. High-grade partial-thickness tearing of the left elbow common  extensor tendon at its origin on the lateral humeral epicondyle,  findings consistent with lateral epicondylitis.  2. Thickening at the origin of the left elbow radial collateral  ligament. The lateral ulnar collateral ligament is not well seen.  3. Minimal fluid in the left elbow joint.  4. No marrow signal abnormalities to suggest fracture, osteonecrosis,  or stress changes.        Again, thank you for allowing me to participate in the care of your patient.        Sincerely,        Anupam Guerrero MD

## 2023-05-08 ENCOUNTER — TELEPHONE (OUTPATIENT)
Dept: ORTHOPEDICS | Facility: CLINIC | Age: 53
End: 2023-05-08
Payer: COMMERCIAL

## 2023-05-11 NOTE — TELEPHONE ENCOUNTER
Left Voicemail (2nd Attempt) for the patient to call back and schedule the following:    Specialty phone number: [phone]  Additional appointment(s) needed: hand therapy, emg, and mri     Adrianne lopez Procedure   Orthopedics, Podiatry, Sports Medicine, Ent ,Eye , Audiology, Adult Endocrine & Diabetes, Nutrition & Medication Therapy Management Specialties   [facility]

## 2023-05-30 ENCOUNTER — ANCILLARY PROCEDURE (OUTPATIENT)
Dept: MRI IMAGING | Facility: CLINIC | Age: 53
End: 2023-05-30
Attending: FAMILY MEDICINE
Payer: COMMERCIAL

## 2023-05-30 DIAGNOSIS — M25.521 BILATERAL ELBOW JOINT PAIN: ICD-10-CM

## 2023-05-30 DIAGNOSIS — M25.522 BILATERAL ELBOW JOINT PAIN: ICD-10-CM

## 2023-05-30 PROCEDURE — 73221 MRI JOINT UPR EXTREM W/O DYE: CPT | Mod: RT | Performed by: RADIOLOGY

## 2023-05-31 LAB — NONINV COLON CA DNA+OCC BLD SCRN STL QL: NEGATIVE

## 2023-06-01 NOTE — RESULT ENCOUNTER NOTE
Dear Domitila,  Your recent colon cancer screening test-Cologuard is negative, this is reassuring  Marcos Ko MD

## 2023-06-08 ENCOUNTER — ANCILLARY PROCEDURE (OUTPATIENT)
Dept: MAMMOGRAPHY | Facility: CLINIC | Age: 53
End: 2023-06-08
Attending: FAMILY MEDICINE
Payer: COMMERCIAL

## 2023-06-08 DIAGNOSIS — Z12.31 ENCOUNTER FOR SCREENING MAMMOGRAM FOR MALIGNANT NEOPLASM OF BREAST: ICD-10-CM

## 2023-06-08 PROCEDURE — 77067 SCR MAMMO BI INCL CAD: CPT

## 2023-06-08 PROCEDURE — 77063 BREAST TOMOSYNTHESIS BI: CPT

## 2023-06-12 ENCOUNTER — ANCILLARY ORDERS (OUTPATIENT)
Dept: RADIOLOGY | Facility: CLINIC | Age: 53
End: 2023-06-12

## 2023-06-27 ENCOUNTER — DOCUMENTATION ONLY (OUTPATIENT)
Dept: OTHER | Facility: CLINIC | Age: 53
End: 2023-06-27
Payer: COMMERCIAL

## 2023-09-11 ENCOUNTER — VIRTUAL VISIT (OUTPATIENT)
Dept: FAMILY MEDICINE | Facility: CLINIC | Age: 53
End: 2023-09-11
Payer: COMMERCIAL

## 2023-09-11 DIAGNOSIS — S50.312A ABRASION OF LEFT ELBOW, INITIAL ENCOUNTER: ICD-10-CM

## 2023-09-11 DIAGNOSIS — M54.2 NECK PAIN: Primary | ICD-10-CM

## 2023-09-11 PROCEDURE — 99213 OFFICE O/P EST LOW 20 MIN: CPT | Mod: VID | Performed by: FAMILY MEDICINE

## 2023-09-11 RX ORDER — CYCLOBENZAPRINE HCL 10 MG
10 TABLET ORAL 3 TIMES DAILY PRN
Qty: 10 TABLET | Refills: 0 | Status: SHIPPED | OUTPATIENT
Start: 2023-09-11 | End: 2023-12-13

## 2023-09-11 RX ORDER — DIPHENHYDRAMINE HCL 25 MG
1 CAPSULE ORAL DAILY PRN
COMMUNITY
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
## 2023-11-08 ENCOUNTER — OFFICE VISIT (OUTPATIENT)
Dept: FAMILY MEDICINE | Facility: CLINIC | Age: 53
End: 2023-11-08
Payer: COMMERCIAL

## 2023-11-08 VITALS
RESPIRATION RATE: 13 BRPM | OXYGEN SATURATION: 99 % | TEMPERATURE: 98.8 F | HEART RATE: 59 BPM | DIASTOLIC BLOOD PRESSURE: 91 MMHG | WEIGHT: 170.3 LBS | BODY MASS INDEX: 28.37 KG/M2 | HEIGHT: 65 IN | SYSTOLIC BLOOD PRESSURE: 147 MMHG

## 2023-11-08 DIAGNOSIS — Z86.39 HISTORY OF TYPE 2 DIABETES MELLITUS: Primary | ICD-10-CM

## 2023-11-08 DIAGNOSIS — Z23 ENCOUNTER FOR IMMUNIZATION: ICD-10-CM

## 2023-11-08 DIAGNOSIS — Z13.21 ENCOUNTER FOR VITAMIN DEFICIENCY SCREENING: ICD-10-CM

## 2023-11-08 DIAGNOSIS — R20.0 NUMBNESS IN BOTH LEGS: ICD-10-CM

## 2023-11-08 DIAGNOSIS — A60.00 RECURRENT GENITAL HERPES: ICD-10-CM

## 2023-11-08 DIAGNOSIS — I10 HYPERTENSION GOAL BP (BLOOD PRESSURE) < 140/90: ICD-10-CM

## 2023-11-08 DIAGNOSIS — Z98.84 S/P GASTRIC BYPASS: ICD-10-CM

## 2023-11-08 DIAGNOSIS — K91.1 DUMPING SYNDROME: ICD-10-CM

## 2023-11-08 LAB
ERYTHROCYTE [DISTWIDTH] IN BLOOD BY AUTOMATED COUNT: 11.7 % (ref 10–15)
HBA1C MFR BLD: 5.4 % (ref 0–5.6)
HCT VFR BLD AUTO: 40.6 % (ref 35–47)
HGB BLD-MCNC: 13.3 G/DL (ref 11.7–15.7)
MCH RBC QN AUTO: 30.6 PG (ref 26.5–33)
MCHC RBC AUTO-ENTMCNC: 32.8 G/DL (ref 31.5–36.5)
MCV RBC AUTO: 94 FL (ref 78–100)
PLATELET # BLD AUTO: 269 10E3/UL (ref 150–450)
RBC # BLD AUTO: 4.34 10E6/UL (ref 3.8–5.2)
WBC # BLD AUTO: 5.3 10E3/UL (ref 4–11)

## 2023-11-08 PROCEDURE — 85027 COMPLETE CBC AUTOMATED: CPT | Performed by: INTERNAL MEDICINE

## 2023-11-08 PROCEDURE — 90686 IIV4 VACC NO PRSV 0.5 ML IM: CPT | Performed by: INTERNAL MEDICINE

## 2023-11-08 PROCEDURE — 90471 IMMUNIZATION ADMIN: CPT | Performed by: INTERNAL MEDICINE

## 2023-11-08 PROCEDURE — 82607 VITAMIN B-12: CPT | Performed by: INTERNAL MEDICINE

## 2023-11-08 PROCEDURE — 83036 HEMOGLOBIN GLYCOSYLATED A1C: CPT | Performed by: INTERNAL MEDICINE

## 2023-11-08 PROCEDURE — 99214 OFFICE O/P EST MOD 30 MIN: CPT | Mod: 25 | Performed by: INTERNAL MEDICINE

## 2023-11-08 PROCEDURE — 80053 COMPREHEN METABOLIC PANEL: CPT | Performed by: INTERNAL MEDICINE

## 2023-11-08 PROCEDURE — 82306 VITAMIN D 25 HYDROXY: CPT | Performed by: INTERNAL MEDICINE

## 2023-11-08 PROCEDURE — 90480 ADMN SARSCOV2 VAC 1/ONLY CMP: CPT | Performed by: INTERNAL MEDICINE

## 2023-11-08 PROCEDURE — 36415 COLL VENOUS BLD VENIPUNCTURE: CPT | Performed by: INTERNAL MEDICINE

## 2023-11-08 PROCEDURE — 80061 LIPID PANEL: CPT | Performed by: INTERNAL MEDICINE

## 2023-11-08 PROCEDURE — 91320 SARSCV2 VAC 30MCG TRS-SUC IM: CPT | Performed by: INTERNAL MEDICINE

## 2023-11-08 RX ORDER — LISINOPRIL 5 MG/1
5 TABLET ORAL DAILY
Qty: 60 TABLET | Refills: 0 | Status: SHIPPED | OUTPATIENT
Start: 2023-11-08 | End: 2024-01-12

## 2023-11-08 RX ORDER — VALACYCLOVIR HYDROCHLORIDE 500 MG/1
500 TABLET, FILM COATED ORAL DAILY
Qty: 90 TABLET | Refills: 3 | Status: SHIPPED | OUTPATIENT
Start: 2023-11-08 | End: 2024-05-24

## 2023-11-08 NOTE — PROGRESS NOTES
Prior to immunization administration, verified patients identity using patient s name and date of birth. Please see Immunization Activity for additional information.     Screening Questionnaire for Adult Immunization    Are you sick today?   No   Do you have allergies to medications, food, a vaccine component or latex?   No   Have you ever had a serious reaction after receiving a vaccination?   No   Do you have a long-term health problem with heart, lung, kidney, or metabolic disease (e.g., diabetes), asthma, a blood disorder, no spleen, complement component deficiency, a cochlear implant, or a spinal fluid leak?  Are you on long-term aspirin therapy?   No   Do you have cancer, leukemia, HIV/AIDS, or any other immune system problem?   No   Do you have a parent, brother, or sister with an immune system problem?   No   In the past 3 months, have you taken medications that affect  your immune system, such as prednisone, other steroids, or anticancer drugs; drugs for the treatment of rheumatoid arthritis, Crohn s disease, or psoriasis; or have you had radiation treatments?   No   Have you had a seizure, or a brain or other nervous system problem?   No   During the past year, have you received a transfusion of blood or blood    products, or been given immune (gamma) globulin or antiviral drug?   No   For women: Are you pregnant or is there a chance you could become       pregnant during the next month?   No   Have you received any vaccinations in the past 4 weeks?   No     Immunization questionnaire answers were all negative.      Patient instructed to remain in clinic for 15 minutes afterwards, and to report any adverse reactions.     Screening performed by Alexsandra Mckeon MA on 11/8/2023 at 4:32 PM.'

## 2023-11-08 NOTE — PROGRESS NOTES
"  Assessment & Plan     1.  Numbness of both feet.  I will be checking B12 level particularly in view of history of gastric bypass.  2.  S/p gastric bypass May 2015.  Patient lost 50 pounds and has kept it off.  Will order complete lab work including CBC, CMP vitamin B12 and vitamin D level and get back to with recommendation.  3.  Hypertension of new onset.  Prescribed lisinopril 5 mg a day.  Side effects discussed particularly thickening cough and angioedema.  Follow-up with Dr. Ko in 4 to 5 weeks with BMP.  Nonfasting lipids will be checked today  4.  Encounter for vitamin deficiency screening.  Vitamin D will be checked.  5.  Recurrent genital herpes.  Patient takes Valtrex 5 mg daily.  Refill provided.  6.  History of type 2 diabetes mellitus prior to gastric bypass.  Resolved with weight loss.  I will check A1c today.  7.  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  Advised on small frequent meals.  Advise high-fiber high-protein and low carbohydrate meals.  Try to keep liquid separate from meals.    BMI:   Estimated body mass index is 28.03 kg/m  as calculated from the following:    Height as of this encounter: 1.66 m (5' 5.35\").    Weight as of this encounter: 77.2 kg (170 lb 4.8 oz).   Weight management plan: Discussed healthy diet and exercise guidelines        Neto Bertrand MD  [facility]    Dayanara Ramesh is a 53 year old, presenting for the following health issues:  Diabetes        11/8/2023     3:45 PM   Additional Questions   Roomed by Naima   Accompanied by Self         11/8/2023     3:45 PM   Patient Reported Additional Medications   Patient reports taking the following new medications None       History of Present Illness       Diabetes:   She presents for follow up of diabetes.    She is not checking blood glucose.        She is concerned about frequent infections and other.   She is having numbness in feet, " excessive thirst, blurry vision and weight gain.            Reason for visit:  Check blood levels and flu and covid vaccnes    She eats 2-3 servings of fruits and vegetables daily.She consumes 4 sweetened beverage(s) daily.She exercises with enough effort to increase her heart rate 30 to 60 minutes per day.  She exercises with enough effort to increase her heart rate 5 days per week. She is missing 1 dose(s) of medications per week.     Patient comes in wanting to have blood pressure check.  Her blood pressure last clinic visit was high and since then she has been checking at home and is remaining somewhat high.  She follows a low-salt diet.  She also has had some numbness in her feet some increased sweating and a shaky feeling.  So once complete blood profile particularly in view of previous gastric bypass surgery.  She notices the shaky feeling and sweating about 4 hours after eating.  Also requesting refill of Valtrex which she uses for recurrent herpes.  Also has come in today for flu and COVID immunization.    Review of Systems   Constitutional, HEENT, cardiovascular, pulmonary, GI, , musculoskeletal, neuro, skin, endocrine and psych systems are negative, except as otherwise noted.      Objective    There were no vitals taken for this visit.  There is no height or weight on file to calculate BMI.  Physical Exam   GENERAL: healthy, alert and no distress  NECK: no adenopathy, no asymmetry, masses, or scars and thyroid normal to palpation  RESP: lungs clear to auscultation - no rales, rhonchi or wheezes  CV: regular rate and rhythm, normal S1 S2, no S3 or S4, no murmur, click or rub, no peripheral edema and peripheral pulses strong  ABDOMEN: soft, nontender, no hepatosplenomegaly, no masses and bowel sounds normal  MS: no gross musculoskeletal defects noted, no edema  NEURO: Normal strength and tone, mentation intact and speech normal

## 2023-11-09 LAB
ALBUMIN SERPL BCG-MCNC: 4.3 G/DL (ref 3.5–5.2)
ALP SERPL-CCNC: 108 U/L (ref 35–104)
ALT SERPL W P-5'-P-CCNC: 48 U/L (ref 0–50)
ANION GAP SERPL CALCULATED.3IONS-SCNC: 12 MMOL/L (ref 7–15)
AST SERPL W P-5'-P-CCNC: 38 U/L (ref 0–45)
BILIRUB SERPL-MCNC: 0.3 MG/DL
BUN SERPL-MCNC: 16.3 MG/DL (ref 6–20)
CALCIUM SERPL-MCNC: 9.5 MG/DL (ref 8.6–10)
CHLORIDE SERPL-SCNC: 104 MMOL/L (ref 98–107)
CHOLEST SERPL-MCNC: 225 MG/DL
CREAT SERPL-MCNC: 0.7 MG/DL (ref 0.51–0.95)
DEPRECATED HCO3 PLAS-SCNC: 25 MMOL/L (ref 22–29)
EGFRCR SERPLBLD CKD-EPI 2021: >90 ML/MIN/1.73M2
GLUCOSE SERPL-MCNC: 94 MG/DL (ref 70–99)
HDLC SERPL-MCNC: 58 MG/DL
LDLC SERPL CALC-MCNC: 119 MG/DL
NONHDLC SERPL-MCNC: 167 MG/DL
POTASSIUM SERPL-SCNC: 4.1 MMOL/L (ref 3.4–5.3)
PROT SERPL-MCNC: 7 G/DL (ref 6.4–8.3)
SODIUM SERPL-SCNC: 141 MMOL/L (ref 135–145)
TRIGL SERPL-MCNC: 240 MG/DL
VIT B12 SERPL-MCNC: 809 PG/ML (ref 232–1245)
VIT D+METAB SERPL-MCNC: 55 NG/ML (ref 20–50)

## 2023-12-07 ENCOUNTER — TELEPHONE (OUTPATIENT)
Dept: FAMILY MEDICINE | Facility: CLINIC | Age: 53
End: 2023-12-07
Payer: COMMERCIAL

## 2023-12-07 NOTE — TELEPHONE ENCOUNTER
Patient Quality Outreach    Patient is due for the following:   Hypertension -  BP check    Next Steps:   Patient has upcoming appointment, these items will be addressed at that time.    Type of outreach:    Chart review performed, no outreach needed.      Questions for provider review:    None           Marlene Henley

## 2023-12-13 ENCOUNTER — VIRTUAL VISIT (OUTPATIENT)
Dept: FAMILY MEDICINE | Facility: CLINIC | Age: 53
End: 2023-12-13
Payer: COMMERCIAL

## 2023-12-13 DIAGNOSIS — R10.13 DYSPEPSIA: ICD-10-CM

## 2023-12-13 DIAGNOSIS — K52.9 CHRONIC DIARRHEA: Primary | ICD-10-CM

## 2023-12-13 DIAGNOSIS — I10 BENIGN ESSENTIAL HYPERTENSION: ICD-10-CM

## 2023-12-13 DIAGNOSIS — Z12.11 COLON CANCER SCREENING: ICD-10-CM

## 2023-12-13 DIAGNOSIS — R25.1 TREMOR: ICD-10-CM

## 2023-12-13 DIAGNOSIS — R74.8 ELEVATED ALKALINE PHOSPHATASE LEVEL: ICD-10-CM

## 2023-12-13 DIAGNOSIS — R14.0 ABDOMINAL BLOATING: ICD-10-CM

## 2023-12-13 DIAGNOSIS — F41.1 GAD (GENERALIZED ANXIETY DISORDER): ICD-10-CM

## 2023-12-13 DIAGNOSIS — G43.709 CHRONIC MIGRAINE WITHOUT AURA WITHOUT STATUS MIGRAINOSUS, NOT INTRACTABLE: ICD-10-CM

## 2023-12-13 PROCEDURE — 99214 OFFICE O/P EST MOD 30 MIN: CPT | Mod: VID | Performed by: FAMILY MEDICINE

## 2023-12-13 RX ORDER — FLUOXETINE 40 MG/1
40 CAPSULE ORAL DAILY
Qty: 90 CAPSULE | Refills: 1 | Status: SHIPPED | OUTPATIENT
Start: 2023-12-13 | End: 2024-01-15

## 2023-12-13 RX ORDER — PROPRANOLOL HYDROCHLORIDE 20 MG/1
20 TABLET ORAL
Qty: 90 TABLET | Refills: 3 | Status: SHIPPED | OUTPATIENT
Start: 2023-12-13 | End: 2024-05-24

## 2023-12-13 RX ORDER — SUMATRIPTAN 25 MG/1
25 TABLET, FILM COATED ORAL
Qty: 18 TABLET | Refills: 1 | Status: SHIPPED | OUTPATIENT
Start: 2023-12-13 | End: 2024-05-24

## 2023-12-13 NOTE — PROGRESS NOTES
"    Instructions Relayed to Patient by Virtual Roomer:     Patient is active on [employer]:   Relayed following to patient: \"It looks like you are active on [employer], are you able to join the visit this way? If not, do you need us to send you a link now or would you like your provider to send a link via text or email when they are ready to initiate the visit?\"    Reminded patient to ensure they were logged on to virtual visit by arrival time listed. Documented in appointment notes if patient had flexibility to initiate visit sooner than arrival time. If pediatric virtual visit, ensured pediatric patient along with parent/guardian will be present for video visit.     Patient offered the website [email or web address] and/or phone number to [employer] Help line: [phone]    Domitila is a 53 year old who is being evaluated via a billable video visit.      How would you like to obtain your AVS? [employer]  If the video visit is dropped, the invitation should be resent by: Text to cell phone: [phone]  Will anyone else be joining your video visit? No          Assessment & Plan     Chronic diarrhea  6 months with abdominal bloating,  intolerance of gluten containing foods  Reviewed recent LFTs also showing slight elevated alkaline phosphatase from May 2023  Reviewed negative Cologuard test from May 2023  .  Evaluate with test as mentioned below  If results are all negative, will consider anxiety further evaluation  Recommend to keep a diary for log of foods triggering the symptoms  Will f/u on results and call with recommendations.    - Lipase; Future  - Hepatic panel (Albumin, ALT, AST, Bili, Alk Phos, TP); Future  - Lipase; Future  - Basic metabolic panel  (Ca, Cl, CO2, Creat, Gluc, K, Na, BUN); Future  - Tissue transglutaminase chuy IgA and IgG; Future    Abdominal bloating  as above    - Lipase; Future  - Hepatic panel (Albumin, ALT, AST, Bili, Alk Phos, TP); Future  - Lipase; Future  - Helicobacter pylori " Antigen Stool; Future  - Tissue transglutaminase chuy IgA and IgG; Future    Dyspepsia  as above    - Lipase; Future  - Hepatic panel (Albumin, ALT, AST, Bili, Alk Phos, TP); Future  - Lipase; Future  - Helicobacter pylori Antigen Stool; Future  - Tissue transglutaminase chuy IgA and IgG; Future    Benign essential hypertension  BP Readings from Last 6 Encounters:   11/08/23 (!) 147/91   05/03/23 116/80   04/17/23 (!) 160/90   12/19/16 124/77   09/29/16 113/65   08/18/16 122/78   Patient has not had labs /blood pressure recheck after starting lisinopril Dr. Warner last month  Recommended to have ancillary blood pressure check this week, along with recheck BMP  - Basic metabolic panel  (Ca, Cl, CO2, Creat, Gluc, K, Na, BUN); Future  - Albumin Random Urine Quantitative with Creat Ratio; Future    TERRANCE (generalized anxiety disorder)  Needing improvement  Recommended to increase the dose of fluoxetine from 20 mg to 40 mg daily  Follow-up with recheck in 5 to 6 weeks or sooner if needed.  - FLUoxetine (PROZAC) 40 MG capsule; Take 1 capsule (40 mg) by mouth daily Dose change  - propranolol (INDERAL) 20 MG tablet; Take 1 tablet (20 mg) by mouth nightly as needed (tremor, anxiety,)    Chronic migraine without aura without status migrainosus, not intractable  Stable, continue current medications and recheck next months  - SUMAtriptan (IMITREX) 25 MG tablet; Take 1 tablet (25 mg) by mouth at onset of headache for migraine May repeat in 2 hours. Max 8 tablets/24 hours.  - propranolol (INDERAL) 20 MG tablet; Take 1 tablet (20 mg) by mouth nightly as needed (tremor, anxiety,)    Tremor    - propranolol (INDERAL) 20 MG tablet; Take 1 tablet (20 mg) by mouth nightly as needed (tremor, anxiety,)    Elevated alkaline phosphatase level  As above in problem #1  Consider imaging  for persistent or worsening concerns    - Lipase; Future  - Hepatic panel (Albumin, ALT, AST, Bili, Alk Phos, TP); Future  - Lipase; Future    Colon cancer  screening  Negative Cologuard from May 2023    Review of the result(s) of each unique test - CBC CMP, Cologuard         Chart documentation done in part with Dragon Voice recognition Software. Although reviewed after completion, some word and grammatical error may remain.    See Patient Instructions    Marcos Ko MD  [facility] LILIANE Ramesh is a 53 year old, presenting for the following health issues:  Hypertension and Memory Loss        12/13/2023     2:52 PM   Additional Questions   Roomed by dayan   Accompanied by SELF   Patient is here for a video visit instead of in person visit due to the current COVID-19 pandemic.      History of Present Illness       Hypertension: She presents for follow up of hypertension.  She does not check blood pressure  regularly outside of the clinic. Outside blood pressures have been over 140/90. She follows a low salt diet.      Pt also wants to talk to provider about brain fog/memory loss and was curious if there was a  medication for that  Pt is having a reaction to gluten her stomach swells and has diarrhea     Hypertension Follow-up    Do you check your blood pressure regularly outside of the clinic? No   Are you following a low salt diet? Yes  Are your blood pressures ever more than 140 on the top number (systolic) OR more   than 90 on the bottom number (diastolic), for example 140/90?  N/A  Patient has not had a blood pressure recheck after starting on lisinopril, has not had a BMP check either    Anxiety Follow-Up  How are you doing with your anxiety since your last visit? 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 Yes:  As above  Have you had a significant life event? Job Concerns   Are you feeling depressed? No  Do you have any concerns with your use of alcohol or other drugs? No    Social History      Tobacco Use    Smoking status: Never    Smokeless tobacco: Never   Vaping Use    Vaping Use: Never used   Substance Use Topics    Alcohol use: No    Drug use: No         2/24/2014     9:14 AM 8/17/2015     1:57 PM   TERRANCE-7 SCORE   Total Score 3 3          No data to display                   No data to display                   No data to display              Migraine   Since your last clinic visit, how have your headaches changed?  Improved  How often are you getting headaches or migraines?  Rare  Are you able to do normal daily activities when you have a migraine? Yes  Are you taking rescue/relief medications? (Select all that apply) sumatriptan (Imitrex)  How helpful is your rescue/relief medication?  I get total relief  Are you taking any medications to prevent migraines? (Select all that apply)  No  In the past 4 weeks, how often have you gone to urgent care or the emergency room because of your headaches?  0    Abdominal symptoms-complaining of having loose stools-large amount of watery stools with no mix of mucus or blood in the stool, mostly postprandial occurring right after she eats anything with gluten in it for the past 6 months associated with increasing abdominal bloating, burping, flatulence, intermittent GERD symptoms  Denies use of NSAIDs  Denies personal or family history of celiac disease, previous similar symptoms in the past, recent history of travel  Has no concerns for fever, chills, weight loss, loss of appetite  Has no family past history of IBD  Patient never had a colonoscopy had a negative Cologuard 7 months ago            Review of Systems   CONSTITUTIONAL: NEGATIVE for fever, chills, change in weight  RESP: NEGATIVE for significant cough or SOB  CV: NEGATIVE for chest pain, palpitations or peripheral edema  CV: Hx HTN  GI: As above  MUSCULOSKELETAL: NEGATIVE for significant arthralgias or myalgia  NEURO: History of migraine  ENDOCRINE: NEGATIVE for temperature intolerance, skin/hair  changes  HEME/ALLERGY/IMMUNE: NEGATIVE for bleeding problems  PSYCHIATRIC: History of anxiety      Objective           Vitals:  No vitals were obtained today due to virtual visit.    Physical Exam   GENERAL: Healthy, alert and no distress  EYES: Eyes grossly normal to inspection  RESP: No audible wheeze, cough, or visible cyanosis.  No visible retractions or increased work of breathing.    NEURO: Cranial nerves grossly intact.  Mentation and speech appropriate for age.  PSYCH: Mentation appears normal, affect normal/bright, judgement and insight intact, normal speech and appearance well-groomed.                Video-Visit Details    Type of service:  Video Visit     Originating Location (pt. Location): Home    Distant Location (provider location):  Off-site  Platform used for Video Visit: [employer]

## 2023-12-20 ENCOUNTER — LAB (OUTPATIENT)
Dept: LAB | Facility: CLINIC | Age: 53
End: 2023-12-20
Payer: COMMERCIAL

## 2023-12-20 ENCOUNTER — ALLIED HEALTH/NURSE VISIT (OUTPATIENT)
Dept: FAMILY MEDICINE | Facility: CLINIC | Age: 53
End: 2023-12-20
Payer: COMMERCIAL

## 2023-12-20 VITALS — SYSTOLIC BLOOD PRESSURE: 120 MMHG | DIASTOLIC BLOOD PRESSURE: 80 MMHG

## 2023-12-20 DIAGNOSIS — I10 BENIGN ESSENTIAL HYPERTENSION: ICD-10-CM

## 2023-12-20 DIAGNOSIS — K52.9 CHRONIC DIARRHEA: ICD-10-CM

## 2023-12-20 DIAGNOSIS — R74.8 ELEVATED ALKALINE PHOSPHATASE LEVEL: ICD-10-CM

## 2023-12-20 DIAGNOSIS — Z01.30 BP CHECK: Primary | ICD-10-CM

## 2023-12-20 DIAGNOSIS — R14.0 ABDOMINAL BLOATING: ICD-10-CM

## 2023-12-20 DIAGNOSIS — R10.13 DYSPEPSIA: ICD-10-CM

## 2023-12-20 LAB
ALBUMIN SERPL BCG-MCNC: 4.3 G/DL (ref 3.5–5.2)
ALP SERPL-CCNC: 117 U/L (ref 40–150)
ALT SERPL W P-5'-P-CCNC: 42 U/L (ref 0–50)
ANION GAP SERPL CALCULATED.3IONS-SCNC: 13 MMOL/L (ref 7–15)
AST SERPL W P-5'-P-CCNC: 26 U/L (ref 0–45)
BILIRUB DIRECT SERPL-MCNC: <0.2 MG/DL (ref 0–0.3)
BILIRUB SERPL-MCNC: 0.3 MG/DL
BUN SERPL-MCNC: 17.9 MG/DL (ref 6–20)
CALCIUM SERPL-MCNC: 9.4 MG/DL (ref 8.6–10)
CHLORIDE SERPL-SCNC: 104 MMOL/L (ref 98–107)
CREAT SERPL-MCNC: 0.99 MG/DL (ref 0.51–0.95)
CREAT UR-MCNC: 125 MG/DL
DEPRECATED HCO3 PLAS-SCNC: 23 MMOL/L (ref 22–29)
EGFRCR SERPLBLD CKD-EPI 2021: 68 ML/MIN/1.73M2
GLUCOSE SERPL-MCNC: 178 MG/DL (ref 70–99)
LIPASE SERPL-CCNC: 33 U/L (ref 13–60)
MICROALBUMIN UR-MCNC: <12 MG/L
MICROALBUMIN/CREAT UR: NORMAL MG/G{CREAT}
POTASSIUM SERPL-SCNC: 4 MMOL/L (ref 3.4–5.3)
PROT SERPL-MCNC: 7.1 G/DL (ref 6.4–8.3)
SODIUM SERPL-SCNC: 140 MMOL/L (ref 135–145)

## 2023-12-20 PROCEDURE — 36415 COLL VENOUS BLD VENIPUNCTURE: CPT

## 2023-12-20 PROCEDURE — 86364 TISS TRNSGLTMNASE EA IG CLAS: CPT

## 2023-12-20 PROCEDURE — 80053 COMPREHEN METABOLIC PANEL: CPT

## 2023-12-20 PROCEDURE — 83690 ASSAY OF LIPASE: CPT

## 2023-12-20 PROCEDURE — 82570 ASSAY OF URINE CREATININE: CPT

## 2023-12-20 PROCEDURE — 82043 UR ALBUMIN QUANTITATIVE: CPT

## 2023-12-20 PROCEDURE — 82248 BILIRUBIN DIRECT: CPT

## 2023-12-20 PROCEDURE — 99207 PR NO CHARGE NURSE ONLY: CPT

## 2023-12-20 NOTE — PROGRESS NOTES
Domitila Luo is a 53 year old patient who comes in today for a Blood Pressure check.  Initial BP:  /80 (BP Location: Right arm, Patient Position: Sitting, Cuff Size: Adult Regular)   LMP 05/03/2023 (Approximate)      Data Unavailable  Disposition: results routed to provider      Dick Harrison RN  [facility]

## 2023-12-21 LAB
TTG IGA SER-ACNC: 0.7 U/ML
TTG IGG SER-ACNC: <0.6 U/ML

## 2023-12-21 PROCEDURE — 87338 HPYLORI STOOL AG IA: CPT

## 2023-12-21 NOTE — RESULT ENCOUNTER NOTE
Mahendra Ramesh,   your recent labs showed normal liver test, pancreatic enzyme, urine exam with no protein leak and negative screening test for celiac disease.  Your kidney functions are normal except for very slightly elevated creatinine  Likely from dehydration from the diarrhea.,  Blood sugar is moderately elevated since it is a nonfasting specimen.  We will await for stool H. pylori results for further recommendations.   Let me know if you have any questions. Take care.  Marcos Ko MD

## 2023-12-26 LAB — H PYLORI AG STL QL IA: NEGATIVE

## 2024-01-12 DIAGNOSIS — I10 HYPERTENSION GOAL BP (BLOOD PRESSURE) < 140/90: ICD-10-CM

## 2024-01-12 RX ORDER — LISINOPRIL 5 MG/1
5 TABLET ORAL DAILY
Qty: 90 TABLET | OUTPATIENT
Start: 2024-01-12

## 2024-01-15 ENCOUNTER — VIRTUAL VISIT (OUTPATIENT)
Dept: FAMILY MEDICINE | Facility: CLINIC | Age: 54
End: 2024-01-15
Attending: FAMILY MEDICINE
Payer: COMMERCIAL

## 2024-01-15 DIAGNOSIS — K52.9 CHRONIC DIARRHEA: Primary | ICD-10-CM

## 2024-01-15 DIAGNOSIS — F41.1 GAD (GENERALIZED ANXIETY DISORDER): ICD-10-CM

## 2024-01-15 DIAGNOSIS — I10 BENIGN ESSENTIAL HYPERTENSION: ICD-10-CM

## 2024-01-15 PROCEDURE — 99214 OFFICE O/P EST MOD 30 MIN: CPT | Mod: 95 | Performed by: FAMILY MEDICINE

## 2024-01-15 RX ORDER — FLUOXETINE 40 MG/1
40 CAPSULE ORAL DAILY
Qty: 90 CAPSULE | Refills: 1 | Status: SHIPPED | OUTPATIENT
Start: 2024-01-15 | End: 2024-05-24

## 2024-01-15 RX ORDER — LISINOPRIL 10 MG/1
10 TABLET ORAL DAILY
Qty: 90 TABLET | Refills: 3 | Status: SHIPPED | OUTPATIENT
Start: 2024-01-15 | End: 2024-02-14 | Stop reason: SINTOL

## 2024-01-15 NOTE — PROGRESS NOTES
"    Instructions Relayed to Patient by Virtual Roomer:     Patient is active on [employer]:   Relayed following to patient: \"It looks like you are active on [employer], are you able to join the visit this way? If not, do you need us to send you a link now or would you like your provider to send a link via text or email when they are ready to initiate the visit?\"    Reminded patient to ensure they were logged on to virtual visit by arrival time listed. Documented in appointment notes if patient had flexibility to initiate visit sooner than arrival time. If pediatric virtual visit, ensured pediatric patient along with parent/guardian will be present for video visit.     Patient offered the website [email or web address] and/or phone number to [employer] Help line: [phone]    Domitila is a 53 year old who is being evaluated via a billable video visit.      How would you like to obtain your AVS? [employer]  If the video visit is dropped, the invitation should be resent by: Text to cell phone: [phone]  Will anyone else be joining your video visit? No        Assessment & Plan     Chronic diarrhea    Due to ongoing symptoms, recommended to proceed with diagnostic colonoscopy for further evaluation  Patient had negative Cologuard test in May 2023  Reviewed and negative celiac screen  - Adult GI  Referral - Procedure Only; Future    Benign essential hypertension  Reviewed moderately elevated blood pressure numbers at home-160s systolic and 100+ diastolic  Recommendation to increase the dose of lisinopril from 5 mg to 10 mg daily,, ancillary blood pressure recheck in 1 to 2 weeks along with counter checking with her home monitor at the same time  Patient verbalised understanding and is agreeable to the plan.    - lisinopril (ZESTRIL) 10 MG tablet; Take 1 tablet (10 mg) by mouth daily Dose change    TERRANCE (generalized anxiety disorder)  Improved, continue current dose of Prozac 40 mg daily  - FLUoxetine " (PROZAC) 40 MG capsule; Take 1 capsule (40 mg) by mouth daily Profile Rx    Review of the result(s) of each unique test - Cologuard test, celiac screen, CBC, BMP         Chart documentation done in part with Dragon Voice recognition Software. Although reviewed after completion, some word and grammatical error may remain.    See Patient Instructions    Marcos Ko MD  [facility] LILIANE Ramesh is a 53 year old, presenting for the following health issues:  Hypertension        1/15/2024     8:25 AM   Additional Questions   Roomed by dayan   Accompanied by self       History of Present Illness       Hypertension: She presents for follow up of hypertension.  She does check blood pressure  regularly outside of the clinic. Outside blood pressures have been over 140/90. She follows a low salt diet.         Hypertension Follow-up    Do you check your blood pressure regularly outside of the clinic? Yes   Are you following a low salt diet? Yes  Are your blood pressures ever more than 140 on the top number (systolic) OR more   than 90 on the bottom number (diastolic), for example 140/90? Yes    Anxiety Follow-Up  How are you doing with your anxiety since your last visit? Improved   Are you having other symptoms that might be associated with anxiety? No  Have you had a significant life event? No   Are you feeling depressed? No  Do you have any concerns with your use of alcohol or other drugs? No    Social History     Tobacco Use    Smoking status: Never    Smokeless tobacco: Never   Vaping Use    Vaping Use: Never used   Substance Use Topics    Alcohol use: No    Drug use: No         2/24/2014     9:14 AM 8/17/2015     1:57 PM   TERRANCE-7 SCORE   Total Score 3 3          No data to display                   No data to display                   No data to display              Chronic diarrhea-patient continues to have diarrhea with abdominal bloating   she had few Labs including celiac screen  were negative recently  Patient deferred a colonoscopy, last Cologuard test was in May 2023        Review of Systems   CONSTITUTIONAL: NEGATIVE for fever, chills, change in weight  RESP: NEGATIVE for significant cough or SOB  CV: NEGATIVE for chest pain, palpitations or peripheral edema  CV: History of hypertension  GI: Chronic diarrhea  MUSCULOSKELETAL: NEGATIVE for significant arthralgias or myalgia  NEURO: NEGATIVE for weakness, dizziness or paresthesias  ENDOCRINE: NEGATIVE for temperature intolerance, skin/hair changes  HEME/ALLERGY/IMMUNE: NEGATIVE for bleeding problems  PSYCHIATRIC: History of anxiety      Objective    Vitals - Patient Reported  Systolic (Patient Reported): (!) 149  Diastolic (Patient Reported): (!) 99        Physical Exam   GENERAL: Healthy, alert and no distress  EYES: Eyes grossly normal to inspection  RESP: No audible wheeze, cough, or visible cyanosis.  No visible retractions or increased work of breathing.    NEURO: Cranial nerves grossly intact.  Mentation and speech appropriate for age.  PSYCH: Mentation appears normal, affect normal/bright, judgement and insight intact, normal speech and appearance well-groomed.                Video-Visit Details    Type of service:  Video Visit     Originating Location (pt. Location): Home    Distant Location (provider location):  Off-site  Platform used for Video Visit: [employer]

## 2024-01-18 ENCOUNTER — TELEPHONE (OUTPATIENT)
Dept: GASTROENTEROLOGY | Facility: CLINIC | Age: 54
End: 2024-01-18
Payer: COMMERCIAL

## 2024-01-18 NOTE — TELEPHONE ENCOUNTER
"Endoscopy Scheduling Screen    Have you had a positive Covid test in the last 14 days?  No    Are you active on MyChart?   Yes    What insurance is in the chart?  Other:  R    Ordering/Referring Provider:     POPPY VIVAR      (If ordering provider performs procedure, schedule with ordering provider unless otherwise instructed. )    BMI: Estimated body mass index is 28.03 kg/m  as calculated from the following:    Height as of 11/8/23: 1.66 m (5' 5.35\").    Weight as of 11/8/23: 77.2 kg (170 lb 4.8 oz).     Sedation Ordered  moderate sedation.   If patient BMI > 50 do not schedule in [facility].    If patient BMI > 45 do not schedule at [facility].    Are you taking methadone or Suboxone?  No    Are you taking any prescription medications for pain 3 or more times per week?   NO - No RN review required.    Do you have a history of malignant hyperthermia or adverse reaction to anesthesia?  Yes (Continue with scheduling. Nurse will notify anesthesia at scheduled location for eval.)    (Females) Are you currently pregnant?   No     Have you been diagnosed or told you have pulmonary hypertension?   No    Do you have an LVAD?  No    Have you been told you have moderate to severe sleep apnea?  Yes (RN Review required for scheduling unless scheduling in Hospital.)    Have you been told you have COPD, asthma, or any other lung disease?  No    Do you have any heart conditions?  No     Have you ever had an organ transplant?   No    Have you ever had or are you awaiting a heart or lung transplant?   No    Have you had a stroke or transient ischemic attack (TIA aka \"mini stroke\" in the last 6 months?   No    Have you been diagnosed with or been told you have cirrhosis of the liver?   No    Are you currently on dialysis?   No    Do you need assistance transferring?   No    BMI: Estimated body mass index is 28.03 kg/m  as calculated from the following:    Height as of 11/8/23: 1.66 m (5' 5.35\").    Weight as of 11/8/23: 77.2 kg (170 lb " 4.8 oz).     Is patients BMI > 40 and scheduling location [facility]?  No    Do you take an injectable medication for weight loss or diabetes (excluding insulin)?  No    Do you take the medication Naltrexone?  No    Do you take blood thinners?  No       Prep   Are you currently on dialysis or do you have chronic kidney disease?  No    Do you have a diagnosis of diabetes?  Yes (Golytely Prep)    Do you have a diagnosis of cystic fibrosis (CF)?  No    On a regular basis do you go 3 -5 days between bowel movements?  No    BMI > 40?  No    Preferred Pharmacy:    [id number] - [place], [place] - [facility] [address] AT [facility] & [address] [address] [place] [address]  Phone: [phone] Fax: [phone]      Final Scheduling Details   Colonoscopy prep sent?  Standard Golytely    Procedure scheduled  Colonoscopy    Surgeon:  Samm     Date of procedure:  2/1     Pre-OP / PAC:   No - Not required for this site.    Location  [facility] - [facility] - Patient preference.    Sedation   Moderate Sedation - Per RN assessment.      Patient Reminders:   You will receive a call from a Nurse to review instructions and health history.  This assessment must be completed prior to your procedure.  Failure to complete the Nurse assessment may result in the procedure being cancelled.      On the day of your procedure, please designate an adult(s) who can drive you home stay with you for the next 24 hours. The medicines used in the exam will make you sleepy. You will not be able to drive.      You cannot take public transportation, ride share services, or non-medical taxi service without a responsible caregiver.  Medical transport services are allowed with the requirement that a responsible caregiver will receive you at your destination.  We require that drivers and caregivers are confirmed prior to your procedure.:       Date of procedure:       Pre-OP / PAC:       Location       Sedation        Chart going to review for her  vicodin allergy. I will call her back once we know when and where she can be scheduled.       Patient Reminders:   You will receive a call from a Nurse to review instructions and health history.  This assessment must be completed prior to your procedure.  Failure to complete the Nurse assessment may result in the procedure being cancelled.      On the day of your procedure, please designate an adult(s) who can drive you home stay with you for the next 24 hours. The medicines used in the exam will make you sleepy. You will not be able to drive.      You cannot take public transportation, ride share services, or non-medical taxi service without a responsible caregiver.  Medical transport services are allowed with the requirement that a responsible caregiver will receive you at your destination.  We require that drivers and caregivers are confirmed prior to your procedure.

## 2024-01-18 NOTE — TELEPHONE ENCOUNTER
"Pre Assessment RN Review    Focused Assessments      KANDIS Hx  Estimated body mass index is 28.03 kg/m  as calculated from the following:    Height as of 11/8/23: 1.66 m (5' 5.35\").    Weight as of 11/8/23: 77.2 kg (170 lb 4.8 oz).     Patient has reported / documented history KANDIS and reports she does use a a device for sleep.     Device:  Mouthguard    Severity Assessment    Stop-Bang:   Complete risk assessment  Link to STOP-Bang Flowsheet :901177}  (CTRL+F11 to refresh)    Complete a new STOP-BANG assessment if last assessment is more that 3 months ago.        1/18/2024    10:40 AM   STOP-Bang Total Score   Total Score 2   Risk Stratification 0 - 2: Low Risk for KANDIS       Do you SNORE loudly (louder than talking or loud enough to be heard through closed doors)?: No  Do you often feel TIRED, fatigued, or sleepy during daytime?: No  Has anyone OBSERVED you stop breathing during your sleep?: No  Do you have or are you being treated for high blood PRESSURE?: Yes  BMI more than 35kg/m2?: No  AGE over 50 years old?: Yes  NECK circumference > 16 inches (40cm)?: No  GENDER: Male?: No          Low Risk   (1 - 2) Intermediate Risk   (3 - 4)   AND NONE of the following:   - Male    - BMI > 35   - Neck Circ > 16\" Intermediate Risk   (3 - 4)   AND ANY of the following:   - Male    - BMI > 35   - Neck Circ > 16\" High Risk   (5+)   No Scheduling Restrictions No Scheduling Restrictions Hospital Only Hospital Only         Scheduling Status & Recommendations    Delay scheduling, awaiting clinical input. Message sent to [facility] anesthesia review for the high warning Vicodin allergy and  moderate sedation.  "

## 2024-01-19 NOTE — TELEPHONE ENCOUNTER
Moderate sedation OK per Dr. Lucero.    Luma Powers, RN  Endoscopy Procedure Pre Assessment RN  [phone] option 4

## 2024-01-19 NOTE — TELEPHONE ENCOUNTER
Left message for patient to call back and continue to schedule her colonoscopy and at [facility]. Okay to have with moderate sedation per notes below.

## 2024-01-28 ENCOUNTER — TELEPHONE (OUTPATIENT)
Dept: GASTROENTEROLOGY | Facility: CLINIC | Age: 54
End: 2024-01-28
Payer: COMMERCIAL

## 2024-01-28 DIAGNOSIS — K52.9 CHRONIC DIARRHEA: Primary | ICD-10-CM

## 2024-01-28 RX ORDER — BISACODYL 5 MG/1
TABLET, DELAYED RELEASE ORAL
Qty: 4 TABLET | Refills: 0 | Status: SHIPPED | OUTPATIENT
Start: 2024-01-28 | End: 2024-02-14

## 2024-01-28 NOTE — TELEPHONE ENCOUNTER
"Pt reports anesthesia reaction, this may be related to below. Discuss with the Pt. On allergies list it states \"unable to wake after ankle surgery\".     Pre visit planning completed.      Procedure details:    Patient scheduled for Colonoscopy  on 2/1/24.     Arrival time: 1330. Procedure time 1415    Pre op exam needed? N/A    Facility location: [facility]; [address], [place] [address] - KANDIS, previously approved by Dr. Lucero for [facility] with CS. Vicodin allergy reviewed as well. Ok for CS per Dr. Lucero.     Sedation type: Conscious sedation     Indication for procedure: Chronic diarrhea       Chart review:     Electronic implanted devices? No    Recent diagnosis of diverticulitis within the last 6 weeks? No    Diabetic?  Hx of Diabetes, has since resolved.   Pt did answer yes to diabetes however on scheduling intake call. Verify if taking medications.       Medication review:    Anticoagulants? No    NSAIDS? No NSAID medications per patient's medication list.  RN will verify with pre-assessment call.    Other medication HOLDING recommendations:  N/A      Prep for procedure:     Bowel prep recommendation: Standard Golytely    Due to:  diabetes.  -- Pt reports.     Prep instructions sent via [employer] Bowel prep script sent to    [facility][id number] - [place], [place] - [address] AT [facility] & [facility]      Angelic Hendricks RN  Endoscopy Procedure Pre Assessment RN  [phone] option 4  "

## 2024-01-29 ENCOUNTER — ALLIED HEALTH/NURSE VISIT (OUTPATIENT)
Dept: FAMILY MEDICINE | Facility: CLINIC | Age: 54
End: 2024-01-29
Payer: COMMERCIAL

## 2024-01-29 VITALS — OXYGEN SATURATION: 97 % | SYSTOLIC BLOOD PRESSURE: 126 MMHG | HEART RATE: 69 BPM | DIASTOLIC BLOOD PRESSURE: 89 MMHG

## 2024-01-29 DIAGNOSIS — I10 HYPERTENSION: Primary | ICD-10-CM

## 2024-01-29 PROCEDURE — 99207 PR NO CHARGE NURSE ONLY: CPT

## 2024-01-29 ASSESSMENT — PAIN SCALES - GENERAL: PAINLEVEL: NO PAIN (0)

## 2024-01-29 NOTE — NURSING NOTE
Domitila Luo is a 54 year old patient who comes in today for a Blood Pressure check.  Initial BP:  /89 (BP Location: Right arm, Patient Position: Sitting, Cuff Size: Adult Regular)   Pulse 69   SpO2 97%      69  Disposition: results routed to provider    June De Santiago MA

## 2024-01-29 NOTE — PROGRESS NOTES
BP Readings from Last 6 Encounters:   01/29/24 126/89   12/20/23 120/80   11/08/23 (!) 147/91   05/03/23 116/80   04/17/23 (!) 160/90   12/19/16 124/77     Blood pressure is at goal, continue with current medications with no change

## 2024-01-29 NOTE — TELEPHONE ENCOUNTER
Patient states she was given succinylcholine for ankle surgery in [facility] and was unable to be woken for 6 hours after procedure.  Patient was advised ok to proceed by ordering provider.    Patient denies taking any diabetic medications.    Pre assessment completed for upcoming procedure.    Procedure details:    Arrival time and facility location reviewed.    Pre op exam needed? N/A    Designated  policy reviewed. Instructed to have someone stay 6 hours post procedure.     COVID policy reviewed.      Medication review:    Medications reviewed. Please see supporting documentation below. Holding recommendations discussed (if applicable).       Prep for procedure:     Reviewed procedure prep instructions.     Patient verbalized understanding and had no questions or concerns at this time.        Corinne Kliber, RN  Endoscopy Procedure Pre Assessment RN  [phone] option 4

## 2024-01-29 NOTE — Clinical Note
Domitila Luo is a 54 year old patient who comes in today for a Blood Pressure check. Initial BP:  /89 (BP Location: Right arm, Patient Position: Sitting, Cuff Size: Adult Regular)   Pulse 69   SpO2 97%     69. Brought home cuff in to calibrate, it was the same.

## 2024-02-01 ENCOUNTER — HOSPITAL ENCOUNTER (OUTPATIENT)
Facility: AMBULATORY SURGERY CENTER | Age: 54
Discharge: HOME | End: 2024-02-01
Attending: COLON & RECTAL SURGERY | Admitting: COLON & RECTAL SURGERY
Payer: COMMERCIAL

## 2024-02-01 VITALS
TEMPERATURE: 97.3 F | HEART RATE: 59 BPM | SYSTOLIC BLOOD PRESSURE: 143 MMHG | OXYGEN SATURATION: 97 % | RESPIRATION RATE: 16 BRPM | DIASTOLIC BLOOD PRESSURE: 86 MMHG

## 2024-02-01 LAB — COLONOSCOPY: NORMAL

## 2024-02-01 PROCEDURE — 45380 COLONOSCOPY AND BIOPSY: CPT

## 2024-02-01 PROCEDURE — G8918 PT W/O PREOP ORDER IV AB PRO: HCPCS

## 2024-02-01 PROCEDURE — 88305 TISSUE EXAM BY PATHOLOGIST: CPT | Performed by: PATHOLOGY

## 2024-02-01 PROCEDURE — G8907 PT DOC NO EVENTS ON DISCHARG: HCPCS

## 2024-02-01 RX ORDER — NALOXONE HYDROCHLORIDE 0.4 MG/ML
0.4 INJECTION, SOLUTION INTRAMUSCULAR; INTRAVENOUS; SUBCUTANEOUS
Status: CANCELLED | OUTPATIENT
Start: 2024-02-01

## 2024-02-01 RX ORDER — FENTANYL CITRATE 50 UG/ML
INJECTION, SOLUTION INTRAMUSCULAR; INTRAVENOUS PRN
Status: DISCONTINUED | OUTPATIENT
Start: 2024-02-01 | End: 2024-02-01 | Stop reason: HOSPADM

## 2024-02-01 RX ORDER — ONDANSETRON 2 MG/ML
4 INJECTION INTRAMUSCULAR; INTRAVENOUS
Status: DISCONTINUED | OUTPATIENT
Start: 2024-02-01 | End: 2024-02-02 | Stop reason: HOSPADM

## 2024-02-01 RX ORDER — ONDANSETRON 2 MG/ML
4 INJECTION INTRAMUSCULAR; INTRAVENOUS EVERY 6 HOURS PRN
Status: CANCELLED | OUTPATIENT
Start: 2024-02-01

## 2024-02-01 RX ORDER — NALOXONE HYDROCHLORIDE 0.4 MG/ML
0.2 INJECTION, SOLUTION INTRAMUSCULAR; INTRAVENOUS; SUBCUTANEOUS
Status: CANCELLED | OUTPATIENT
Start: 2024-02-01

## 2024-02-01 RX ORDER — LIDOCAINE 40 MG/G
CREAM TOPICAL
Status: DISCONTINUED | OUTPATIENT
Start: 2024-02-01 | End: 2024-02-02 | Stop reason: HOSPADM

## 2024-02-01 RX ORDER — FLUMAZENIL 0.1 MG/ML
0.2 INJECTION, SOLUTION INTRAVENOUS
Status: CANCELLED | OUTPATIENT
Start: 2024-02-01 | End: 2024-02-02

## 2024-02-01 RX ORDER — ONDANSETRON 4 MG/1
4 TABLET, ORALLY DISINTEGRATING ORAL EVERY 6 HOURS PRN
Status: CANCELLED | OUTPATIENT
Start: 2024-02-01

## 2024-02-01 RX ORDER — PROCHLORPERAZINE MALEATE 10 MG
10 TABLET ORAL EVERY 6 HOURS PRN
Status: CANCELLED | OUTPATIENT
Start: 2024-02-01

## 2024-02-01 NOTE — H&P
Pre-Endoscopy History and Physical     Domitila Luo MRN# [id number]   YOB: 1970 Age: 54 year old     Date of Procedure: 02/01/24  Primary care provider: Marcos Ko  Type of Endoscopy: Colonoscopy  Reason for Procedure: diagnostic  Type of Anesthesia Anticipated: Moderate Sedation    HPI:    Domitila is a 54 year old female who will be undergoing the above procedure.  She is undergoing workup for diarrhea.     Prior colonoscopy: none    A history and physical has been performed, notable for previous gastric bypass surgery. The patient's medications and allergies have been reviewed. The risks and benefits of the procedure and the sedation options and risks were discussed with the patient.  All questions were answered and informed consent was obtained.      She denies a history of bleeding disorders but has had problems with succinylcholine for anesthesia (slow to wake up). She denies any family history of CRC or IBD.    Allergies   Allergen Reactions    Succinylcholine Other (See Comments)     Unable to wake after ankle surgery    Vicodin [Hydrocodone-Acetaminophen] Hives    Penicillins      Unknown reaction in childhood      Allergy to Latex: NO   Allergy to tape: NO   Intolerances: NO     Ascorbic Acid (VITAMIN C PO), Take 500 mg by mouth every morning   Cyanocobalamin (B-12 PO), Take 1 drop by mouth every morning 1000 mcg per drop  lisinopril (ZESTRIL) 10 MG tablet, Take 1 tablet (10 mg) by mouth daily Dose change  lisinopril (ZESTRIL) 5 MG tablet, TAKE 1 TABLET(5 MG) BY MOUTH DAILY  Multiple Vitamins-Minerals (MULTIVITAMIN PO), Take 2 tablets by mouth every morning   propranolol (INDERAL) 20 MG tablet, Take 1 tablet (20 mg) by mouth nightly as needed (tremor, anxiety,)  Vitamin D, Cholecalciferol, 25 MCG (1000 UT) CAPS, Take 2,000 Units by mouth every morning  Acetaminophen (TYLENOL PO), Take 325 mg by mouth every 6 hours as needed for mild pain or fever  Calcium Carbonate-Vitamin D  (CALCIUM + D PO), Take 600 mg by mouth 2 times daily (with meals)  Ferrous Sulfate (IRON SUPPLEMENT PO), Take 65 mg by mouth every morning  FLUoxetine (PROZAC) 40 MG capsule, Take 1 capsule (40 mg) by mouth daily Profile Rx  hypromellose-dextran (HYPROMELLOSE-DEXTRAN 0.3-0.1%) 0.1-0.3 % ophthalmic solution, Place 1 drop into both eyes daily as needed  order for DME, Equipment being ordered: right wrist splint-  SUMAtriptan (IMITREX) 25 MG tablet, Take 1 tablet (25 mg) by mouth at onset of headache for migraine May repeat in 2 hours. Max 8 tablets/24 hours.  valACYclovir (VALTREX) 500 MG tablet, Take 1 tablet (500 mg) by mouth daily (Patient not taking: Reported on 12/13/2023)    No current facility-administered medications on file prior to encounter.      Patient Active Problem List   Diagnosis    TERRANCE (generalized anxiety disorder)    S/P epigastric hernia repair    Benign essential hypertension    Migraine    Hypokalemia    Recurrent genital herpes    Snoring    Fatigue    Encounter for Essure implantation    Stress incontinence    Plantar fasciitis    Bariatric surgery status    Hx of LASIK    No diabetic retinopathy in both eyes    S/P gastric bypass    KANDIS (obstructive sleep apnea)    Hypertriglyceridemia    Knee pain, unspecified laterality    Stress    Tendinitis of right wrist    BMI 27.0-27.9,adult    Postsurgical malabsorption    H/O diabetes mellitus    Hyperlipidemia LDL goal <100    History of hypertension    Dizziness    Tremor    Dumping syndrome    Chronic diarrhea        Past Medical History:   Diagnosis Date    Diabetes mellitus     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    Fatigue 01/27/2015    Hypertension     Lateral epicondylitis 11/21/2013    Sleep apnea     uses cpap    Surgical complications         Past Surgical History:   Procedure Laterality Date    ABDOMINOPLASTY  12/16/2022    FOOT SURGERY      HERNIA REPAIR      LAPAROSCOPIC BYPASS GASTRIC N/A 05/28/2015    Procedure: LAPAROSCOPIC BYPASS GASTRIC;   "Surgeon: Eulalio Loza MD;  Location:  OR    [facility]     LIVER BIOPSY      ZZC STOMACH SURGERY PROCEDURE UNLISTED         Social History     Tobacco Use    Smoking status: Never    Smokeless tobacco: Never   Substance Use Topics    Alcohol use: No       Family History   Problem Relation Age of Onset    Hypertension Maternal Grandmother     Cancer Sister         liver cancer    Thyroid Disease Sister     Thyroid Disease Mother     Cerebrovascular Disease Paternal Uncle     Glaucoma No family hx of     Macular Degeneration No family hx of     Diabetes Paternal Grandmother     Diabetes Other        REVIEW OF SYSTEMS:     5 point ROS negative except as noted above in HPI, including Gen., Resp., CV, GI &  system review.      PHYSICAL EXAM:   /81 (Cuff Size: Adult Regular)   Temp 97.5  F (36.4  C) (Temporal)   Resp 14   SpO2 98%  Estimated body mass index is 28.03 kg/m  as calculated from the following:    Height as of 11/8/23: 1.66 m (5' 5.35\").    Weight as of 11/8/23: 77.2 kg (170 lb 4.8 oz).   All Vitals have been reviewed.    GENERAL APPEARANCE: healthy and alert  MENTAL STATUS: alert  AIRWAY EXAM: Mallampatti Class I (visualization of the soft palate, fauces, uvula, anterior and posterior pillars)  RESP: lungs clear to auscultation - no rales, rhonchi or wheezes  CV: regular rates and rhythm      IMPRESSION   ASA Class 2 - Mild systemic disease        PLAN:     Plan for colonoscopy. We discussed the risks, benefits and alternatives and the patient wished to proceed.    The above has been forwarded to the consulting provider.    Josephine Quijano MD, MS, FACS, FASCRS  [facility]  Office: [phone]  2/1/2024 1:54 PM         "

## 2024-02-05 LAB
PATH REPORT.COMMENTS IMP SPEC: NORMAL
PATH REPORT.COMMENTS IMP SPEC: NORMAL
PATH REPORT.FINAL DX SPEC: NORMAL
PATH REPORT.GROSS SPEC: NORMAL
PATH REPORT.MICROSCOPIC SPEC OTHER STN: NORMAL
PATH REPORT.RELEVANT HX SPEC: NORMAL
PHOTO IMAGE: NORMAL

## 2024-02-14 ENCOUNTER — VIRTUAL VISIT (OUTPATIENT)
Dept: FAMILY MEDICINE | Facility: CLINIC | Age: 54
End: 2024-02-14
Payer: COMMERCIAL

## 2024-02-14 DIAGNOSIS — I10 BENIGN ESSENTIAL HYPERTENSION: ICD-10-CM

## 2024-02-14 DIAGNOSIS — K52.9 CHRONIC DIARRHEA: Primary | ICD-10-CM

## 2024-02-14 DIAGNOSIS — R05.8 DRY COUGH: ICD-10-CM

## 2024-02-14 PROCEDURE — 99214 OFFICE O/P EST MOD 30 MIN: CPT | Mod: 95 | Performed by: FAMILY MEDICINE

## 2024-02-14 RX ORDER — LOSARTAN POTASSIUM 25 MG/1
25 TABLET ORAL DAILY
Qty: 90 TABLET | Refills: 3 | Status: SHIPPED | OUTPATIENT
Start: 2024-02-14 | End: 2024-02-26

## 2024-03-05 ENCOUNTER — ALLIED HEALTH/NURSE VISIT (OUTPATIENT)
Dept: FAMILY MEDICINE | Facility: CLINIC | Age: 54
End: 2024-03-05
Payer: COMMERCIAL

## 2024-03-05 VITALS — SYSTOLIC BLOOD PRESSURE: 137 MMHG | DIASTOLIC BLOOD PRESSURE: 84 MMHG

## 2024-03-05 DIAGNOSIS — Z01.30 BLOOD PRESSURE CHECK: Primary | ICD-10-CM

## 2024-03-05 PROCEDURE — 99207 PR NO CHARGE NURSE ONLY: CPT

## 2024-03-05 NOTE — PROGRESS NOTES
I met with Domitila Luo at the request of Dr. Ko to recheck her blood pressure.  Blood pressure medications on the med list were reviewed with patient.    Patient has taken all medications as per usual regimen: Yes  Patient reports tolerating them without any issues or concerns: No    There were no vitals filed for this visit.    Blood pressure was taken, previous encounter was reviewed, recorded blood pressure below 140/90.  Patient was discharged and the note will be sent to the provider for final review.

## 2024-03-29 ENCOUNTER — OFFICE VISIT (OUTPATIENT)
Dept: OPTOMETRY | Facility: CLINIC | Age: 54
End: 2024-03-29
Payer: COMMERCIAL

## 2024-03-29 DIAGNOSIS — Z01.00 EXAMINATION OF EYES AND VISION: Primary | ICD-10-CM

## 2024-03-29 DIAGNOSIS — H52.223 REGULAR ASTIGMATISM OF BOTH EYES: ICD-10-CM

## 2024-03-29 DIAGNOSIS — H04.123 DRY EYE SYNDROME OF BOTH EYES: ICD-10-CM

## 2024-03-29 DIAGNOSIS — Z98.890 HX OF LASIK: ICD-10-CM

## 2024-03-29 DIAGNOSIS — Z86.39 H/O DIABETES MELLITUS: ICD-10-CM

## 2024-03-29 DIAGNOSIS — H52.4 PRESBYOPIA: ICD-10-CM

## 2024-03-29 DIAGNOSIS — H52.13 MYOPIA OF BOTH EYES: ICD-10-CM

## 2024-03-29 PROCEDURE — 92004 COMPRE OPH EXAM NEW PT 1/>: CPT | Performed by: OPTOMETRIST

## 2024-03-29 PROCEDURE — 92015 DETERMINE REFRACTIVE STATE: CPT | Performed by: OPTOMETRIST

## 2024-03-29 RX ORDER — LIFITEGRAST 50 MG/ML
1 SOLUTION/ DROPS OPHTHALMIC 2 TIMES DAILY
Qty: 60 EACH | Refills: 11 | Status: SHIPPED | OUTPATIENT
Start: 2024-03-29 | End: 2025-03-29

## 2024-03-29 ASSESSMENT — REFRACTION_WEARINGRX
OS_SPHERE: -1.75
SPECS_TYPE: EXAM
OS_AXIS: 030
OS_AXIS: 015
OD_AXIS: 178
OD_CYLINDER: SPHERE
OS_CYLINDER: +0.75
OD_SPHERE: -1.50
OS_CYLINDER: +0.25
OS_ADD: +2.25
OD_SPHERE: -1.25
OD_CYLINDER: +0.25
OD_ADD: +2.25
SPECS_TYPE: SVL/DISTANCE
OS_SPHERE: -1.75

## 2024-03-29 ASSESSMENT — CUP TO DISC RATIO
OD_RATIO: 0.3
OS_RATIO: 0.2

## 2024-03-29 ASSESSMENT — VISUAL ACUITY
OD_SC: 20/40
OD_CC: 20/50
OS_CC+: -2
CORRECTION_TYPE: GLASSES
OD_CC: 20/20
OS_CC: 20/40
OD_SC+: -2
OS_CC: 20/25
METHOD: SNELLEN - LINEAR
OS_SC: 20/150
OD_CC+: -1

## 2024-03-29 ASSESSMENT — CONF VISUAL FIELD
OS_INFERIOR_TEMPORAL_RESTRICTION: 0
OD_INFERIOR_TEMPORAL_RESTRICTION: 0
OS_NORMAL: 1
OS_SUPERIOR_TEMPORAL_RESTRICTION: 0
OD_NORMAL: 1
OS_SUPERIOR_NASAL_RESTRICTION: 0
OD_INFERIOR_NASAL_RESTRICTION: 0
OS_INFERIOR_NASAL_RESTRICTION: 0
OD_SUPERIOR_NASAL_RESTRICTION: 0
OD_SUPERIOR_TEMPORAL_RESTRICTION: 0

## 2024-03-29 ASSESSMENT — REFRACTION_MANIFEST
OS_CYLINDER: +0.50
OD_ADD: +2.25
OS_AXIS: 025
OS_SPHERE: -2.00
OS_ADD: +2.25
OD_AXIS: 178
METHOD_AUTOREFRACTION: 1
OD_SPHERE: -1.25
OD_CYLINDER: +0.75

## 2024-03-29 ASSESSMENT — TONOMETRY
OD_IOP_MMHG: 19
IOP_METHOD: TONOPEN
OS_IOP_MMHG: 18

## 2024-03-29 ASSESSMENT — KERATOMETRY
OD_K2POWER_DIOPTERS: 45.50
OS_AXISANGLE2_DEGREES: 162
OS_AXISANGLE_DEGREES: 072
OD_AXISANGLE2_DEGREES: 177
OD_K1POWER_DIOPTERS: 44.75
OS_K1POWER_DIOPTERS: 45.50
OD_AXISANGLE_DEGREES: 087
OS_K2POWER_DIOPTERS: 46.50

## 2024-03-29 ASSESSMENT — EXTERNAL EXAM - RIGHT EYE: OD_EXAM: NORMAL

## 2024-03-29 ASSESSMENT — EXTERNAL EXAM - LEFT EYE: OS_EXAM: NORMAL

## 2024-03-29 NOTE — PROGRESS NOTES
Chief Complaint   Patient presents with    Diabetic Eye Exam        Chief Complaint(s) and History of Present Illness(es)       Diabetic Eye Exam              Diabetes Type: Type 2 and controlled with diet    Duration: 10 years    Blood Sugars: is controlled                   Lab Results   Component Value Date    A1C 5.4 11/08/2023    A1C 5.3 11/17/2016    A1C 5.6 04/13/2016    A1C 5.9 08/17/2015    A1C 7.3 05/21/2015    A1C 7.1 02/20/2015     Last Eye Exam: 6-1-2022  Dilated Previously: Yes    What are you currently using to see?  glasses    Distance Vision Acuity: Satisfied with vision    Near Vision Acuity: Not satisfied,computer and reading getting harder - uses specific computer/near glasses from [employer] with blue blocking tint    Eye Comfort: dry, yasir, and tired x 5 months  Do you use eye drops? : Yes: otc drops- 3-4 x day- Refresh tears-  Occupation or Hobbies: computer all day     History of Lasik right eye     Malissa Mccauley Optometric Assistant, A.B.O.C.     Medical, surgical and family histories reviewed and updated 3/29/2024.       OBJECTIVE: See Ophthalmology exam    ASSESSMENT:    ICD-10-CM    1. Examination of eyes and vision  Z01.00 EYE EXAM (SIMPLE-NONBILLABLE)      2. Hx of LASIK  Z98.890 EYE EXAM (SIMPLE-NONBILLABLE)      3. H/O diabetes mellitus  Z86.39 EYE EXAM (SIMPLE-NONBILLABLE)    Negative diabetic retinopathy both eyes      4. Dry eye syndrome of both eyes  H04.123 EYE EXAM (SIMPLE-NONBILLABLE)     lifitegrast (XIIDRA) 5 % opthalmic solution      5. Myopia of both eyes  H52.13 REFRACTION      6. Presbyopia  H52.4 REFRACTION      7. Regular astigmatism of both eyes  H52.223           PLAN:    Domitila Luo aware  eye exam results will be sent to Marcos Ko  Patient Instructions   Heat to the eyes daily for 10-15 minutes nightly with warm washcloth or reusable gel masks from the pharmacy or  Diana heat masks can be purchased at [employer].    Tea tree oil wipes or foam to cleanse  eyelids/lashes in am and pm.    Xiidra- (Liftegras ophthalmic solution 5 %) 1 drop both eyes 2 x day.    Non preserved artificial tears- 1 drop both eyes 2-4 x day.    If no improvement after 6-8 weeks then referral to [facility]    Eyeglass prescription given.    Return in 1 year for a complete eye exam or sooner if needed.    Brayan Schultz, OD

## 2024-03-29 NOTE — LETTER
3/29/2024         RE: Domitila Loveo  [address] [facility] [address]        Dear Colleague,    Thank you for referring your patient, Domitila Luo, to the [facility]. Please see a copy of my visit note below.    Chief Complaint   Patient presents with     Diabetic Eye Exam        Chief Complaint(s) and History of Present Illness(es)       Diabetic Eye Exam              Diabetes Type: Type 2 and controlled with diet    Duration: 10 years    Blood Sugars: is controlled                   Lab Results   Component Value Date    A1C 5.4 11/08/2023    A1C 5.3 11/17/2016    A1C 5.6 04/13/2016    A1C 5.9 08/17/2015    A1C 7.3 05/21/2015    A1C 7.1 02/20/2015     Last Eye Exam: 6-1-2022  Dilated Previously: Yes    What are you currently using to see?  glasses    Distance Vision Acuity: Satisfied with vision    Near Vision Acuity: Not satisfied,computer and reading getting harder - uses specific computer/near glasses from [employer] with blue blocking tint    Eye Comfort: dry, yasir, and tired x 5 months  Do you use eye drops? : Yes: otc drops- 3-4 x day- Refresh tears-  Occupation or Hobbies: computer all day     History of Lasik right eye     Malissa Mccauley Optometric Assistant, A.B.O.C.     Medical, surgical and family histories reviewed and updated 3/29/2024.       OBJECTIVE: See Ophthalmology exam    ASSESSMENT:    ICD-10-CM    1. Examination of eyes and vision  Z01.00 EYE EXAM (SIMPLE-NONBILLABLE)      2. Hx of LASIK  Z98.890 EYE EXAM (SIMPLE-NONBILLABLE)      3. H/O diabetes mellitus  Z86.39 EYE EXAM (SIMPLE-NONBILLABLE)    Negative diabetic retinopathy both eyes      4. Dry eye syndrome of both eyes  H04.123 EYE EXAM (SIMPLE-NONBILLABLE)     lifitegrast (XIIDRA) 5 % opthalmic solution      5. Myopia of both eyes  H52.13 REFRACTION      6. Presbyopia  H52.4 REFRACTION      7. Regular astigmatism of both eyes  H52.223           PLAN:    Domitila Luo aware  eye exam results  will be sent to Marcos Ko  Patient Instructions   Heat to the eyes daily for 10-15 minutes nightly with warm washcloth or reusable gel masks from the pharmacy or  [employer] heat masks can be purchased at [employer].    Tea tree oil wipes or foam to cleanse eyelids/lashes in am and pm.    Xiidra- (Liftegras ophthalmic solution 5 %) 1 drop both eyes 2 x day.    Non preserved artificial tears- 1 drop both eyes 2-4 x day.    If no improvement after 6-8 weeks then referral to [facility]    Eyeglass prescription given.    Return in 1 year for a complete eye exam or sooner if needed.    Brayan Schultz, OD               Again, thank you for allowing me to participate in the care of your patient.        Sincerely,        Brayan Schultz, OD

## 2024-03-29 NOTE — PATIENT INSTRUCTIONS
Heat to the eyes daily for 10-15 minutes nightly with warm washcloth or reusable gel masks from the pharmacy or  LensAR heat masks can be purchased at [employer].    Tea tree oil wipes or foam to cleanse eyelids/lashes in am and pm.    Xiidra- (Liftegras ophthalmic solution 5 %) 1 drop both eyes 2 x day.    Non preserved artificial tears- 1 drop both eyes 2-4 x day.    If no improvement after 6-8 weeks then referral to [facility]    Eyeglass prescription given.    Return in 1 year for a complete eye exam or sooner if needed.    Brayan Schultz, OD    The affects of the dilating drops last for 4- 6 hours.  You will be more sensitive to light and vision will be blurry up close.  Do not drive if you do not feel comfortable.  Mydriatic sunglasses were given if needed.    There is a combination of three treatments which can greatly improve symptoms of dry eyes.     Artificial tears  Heat (eyes closed)  Eyelid and eyelash cleansing (eyes closed)     Use one drop of artificial tears both eyes 4 x daily.  Once in the morning, lunch, dinner and bedtime. Continue to use the drops regardless if your eyes are comfortable or not.  Artificial tears work best as a preventative and not as well after your eyes are starting to bother you.  It may take 4- 6 weeks of using the drops before you notice improvement.  If after that time you are still having problems schedule an appointment for an evaluation and discussion of different treatments such as Restasis or Xiidra.  Dry eyes are a chronic condition and you may have more symptoms at certain times of the year.    Excess tearing can be due to the right tears not working properly or a blockage in the tear drainage system.  You can try using artificial tears 1 drop both eyes 4 x day.  If the excess tearing is bothersome after 4-6 weeks of treatment then we can send you for further testing.  This would entail a referral to our oculoplastic specialist Dr. Ginny Hernández at  [facility]-[phone].    Recommended brands are:    Systane Complete  Systane Ultra  Systane Balance  Refresh Advanced Optive  Refresh Relieva  Blink    Recommended brands for contact lens wearers are:    Systane contacts  Refresh contacts  Blink contacts    If you are using drops more than 4 x day or have sensitivities to preservatives I recommend non preserved artificial tears.  These come in 1 use vials.  They can be used every 1-2 hours.  Do not reuse the vials.    Recommended brands are:    Refresh Optive Gabe-3  Systane- preservative free  Refresh-  preservative free  Blink- preservative free    Gels or ointment can be used at night.    Recommended brands are:    Systane Gel  Refresh Gel  Blink Gel  Genteal Gel    Systane night time (ointment)  Refresh Celluvisc  Refresh PM (ointment)    Optase dry eye spray.  Spray to eyelids 3-4 x daily.  This can be purchased on Amazon.      Visine, Clear Eyes or Murine (drops that get the red out) can irritate the eyes and cause a rebound effect where the eyes become more red and you end up using more drops.  Avoid drops containing tetrahydrozoline, naphazoline, phenylephrine, oxymetazoline.      OTC Lumify is a newer product that gives immediate redness relief without the rebound effect.  Use as needed to take the redness out.    Artificial tears may be used with other drops (such as allergy, glaucoma, antibiotics) around the same time.  Be sure to wait 5 minutes in between drops.    Heat to the eyelids can also improve your symptoms of dry eyes.  Diana heat masks can be purchased at [employer] to be used nightly for 10-15 minutes.  Other options are gel masks that can be put in the microwave and purchased at most pharmacies.      Tea Tree Oil eyelid cleansers recommended are Ocusoft Oust foam cleanser to cleanse eyelids/lashes at night and in the am. Other options are Blephadex or Cliradex eyelid wipes.  KEEP EYES CLOSED when using these products.  These can be  purchased on amazon.com   A good product for make up remover with tea tree oil is WeLLealta MediaEyes.  This can be found at www.[employer] or [employer].    Other good eyelid cleansers have hypochlorous which removes excess bacteria and is safe around the eyes. Products are Avenova, Ocusoft Hypochlor or Heyedrate. Spray solution onto cotton pad, close eyes and gently apply to eyelids and eyelashes using side to side motion.  You can also KEEP EYES CLOSED spray and rub into eyelashes.  You do not need to rinse it off. Use morning and evening. These products can be found on Amazon.  You can check with your local pharmacy and see if they can order if for you if they don't have it.    Other brands of eyelid cleansing wipes are:    Ocusoft wipes  Systane wipes    A great eye make up line is [email or web address]      Optometry Providers       Clinic Locations                                 Telephone Number   Dr. Bonnie Bell [place]    [facility] [phone]     [place] [facility] Hours:                [place] [facility] Hours:       [place] [facility] Hours:   [address]   [address]     [address] [place] [address]   [place] [address]    [place] [address]  Phone: [phone]                    Phone: [phone]     Phone: [phone]                      Monday 8:00-6:00                          Monday 8:00-6:00                          Monday 8:00-6:00              Tuesday 8:00-6:00                          Tuesday 8:00-6:00                          Tuesday 8:00-6:00              Wednesday 8:00-6:00                  Wednesday 8:00-6:00                   Wednesday 8:00-6:00      Thursday 8:00-6:00                        Thursday 8:00-6:00                         Thursday 8:00-6:00            Friday 8:00-5:00                               Friday 8:00-5:00                              Friday 8:00-5:00    [facility] Hours:   [address] [place] [address] [phone]    Monday 9:00-6:00  Tuesday 9:00-6:00  Wednesday 9:00-6:00  Thursday 9:00-6:00  Friday 9:00-5:00  As always, Thank you for trusting us with your health care needs!

## 2024-05-03 ENCOUNTER — DOCUMENTATION ONLY (OUTPATIENT)
Dept: FAMILY MEDICINE | Facility: CLINIC | Age: 54
End: 2024-05-03
Payer: COMMERCIAL

## 2024-05-03 DIAGNOSIS — Z13.1 SCREENING FOR DIABETES MELLITUS (DM): ICD-10-CM

## 2024-05-03 DIAGNOSIS — I10 BENIGN ESSENTIAL HYPERTENSION: Primary | ICD-10-CM

## 2024-05-03 DIAGNOSIS — E67.3 HYPERVITAMINOSIS D: ICD-10-CM

## 2024-05-03 DIAGNOSIS — Z13.0 SCREENING FOR DEFICIENCY ANEMIA: ICD-10-CM

## 2024-05-03 DIAGNOSIS — R73.01 ELEVATED FASTING GLUCOSE: ICD-10-CM

## 2024-05-03 DIAGNOSIS — Z13.220 SCREENING FOR HYPERLIPIDEMIA: ICD-10-CM

## 2024-05-03 NOTE — PROGRESS NOTES
Domitila Luo has an upcoming lab appointment:    Future Appointments   Date Time Provider Department Center   5/20/2024  7:00 AM [facility] LAB ONLY [facility]   5/24/2024  7:00 AM Marcos Ko MD [facility]   6/14/2024 10:15 AM [facility] [place]     Patient is scheduled for the following lab(s):     There is no order available. Please review and place either future orders or HMPO (Review of Health Maintenance Protocol Orders), as appropriate.    Health Maintenance Due   Topic    ANNUAL REVIEW OF HM ORDERS      Adenike Merino

## 2024-05-21 ENCOUNTER — LAB (OUTPATIENT)
Dept: LAB | Facility: CLINIC | Age: 54
End: 2024-05-21
Payer: COMMERCIAL

## 2024-05-21 DIAGNOSIS — K52.9 CHRONIC DIARRHEA: ICD-10-CM

## 2024-05-21 DIAGNOSIS — Z13.220 SCREENING FOR HYPERLIPIDEMIA: ICD-10-CM

## 2024-05-21 DIAGNOSIS — I10 BENIGN ESSENTIAL HYPERTENSION: ICD-10-CM

## 2024-05-21 DIAGNOSIS — E67.3 HYPERVITAMINOSIS D: ICD-10-CM

## 2024-05-21 DIAGNOSIS — R14.0 ABDOMINAL BLOATING: ICD-10-CM

## 2024-05-21 DIAGNOSIS — Z13.1 SCREENING FOR DIABETES MELLITUS (DM): ICD-10-CM

## 2024-05-21 DIAGNOSIS — Z78.0 MENOPAUSE: ICD-10-CM

## 2024-05-21 DIAGNOSIS — R10.13 DYSPEPSIA: ICD-10-CM

## 2024-05-21 DIAGNOSIS — Z13.0 SCREENING FOR DEFICIENCY ANEMIA: ICD-10-CM

## 2024-05-21 DIAGNOSIS — R74.8 ELEVATED ALKALINE PHOSPHATASE LEVEL: ICD-10-CM

## 2024-05-21 DIAGNOSIS — R73.01 ELEVATED FASTING GLUCOSE: ICD-10-CM

## 2024-05-21 LAB
ANION GAP SERPL CALCULATED.3IONS-SCNC: 10 MMOL/L (ref 7–15)
BUN SERPL-MCNC: 13.2 MG/DL (ref 6–20)
CALCIUM SERPL-MCNC: 9.2 MG/DL (ref 8.6–10)
CHLORIDE SERPL-SCNC: 106 MMOL/L (ref 98–107)
CHOLEST SERPL-MCNC: 223 MG/DL
CREAT SERPL-MCNC: 0.76 MG/DL (ref 0.51–0.95)
CREAT UR-MCNC: 164 MG/DL
DEPRECATED HCO3 PLAS-SCNC: 26 MMOL/L (ref 22–29)
EGFRCR SERPLBLD CKD-EPI 2021: >90 ML/MIN/1.73M2
ERYTHROCYTE [DISTWIDTH] IN BLOOD BY AUTOMATED COUNT: 11.7 % (ref 10–15)
FASTING STATUS PATIENT QL REPORTED: YES
FASTING STATUS PATIENT QL REPORTED: YES
FSH SERPL IRP2-ACNC: 54.5 MIU/ML
GLUCOSE SERPL-MCNC: 110 MG/DL (ref 70–99)
HBA1C MFR BLD: 5.5 % (ref 0–5.6)
HCT VFR BLD AUTO: 41.7 % (ref 35–47)
HDLC SERPL-MCNC: 64 MG/DL
HGB BLD-MCNC: 13.5 G/DL (ref 11.7–15.7)
LDLC SERPL CALC-MCNC: 125 MG/DL
LIPASE SERPL-CCNC: 32 U/L (ref 13–60)
MCH RBC QN AUTO: 29.4 PG (ref 26.5–33)
MCHC RBC AUTO-ENTMCNC: 32.4 G/DL (ref 31.5–36.5)
MCV RBC AUTO: 91 FL (ref 78–100)
MICROALBUMIN UR-MCNC: <12 MG/L
MICROALBUMIN/CREAT UR: NORMAL MG/G{CREAT}
NONHDLC SERPL-MCNC: 159 MG/DL
PLATELET # BLD AUTO: 243 10E3/UL (ref 150–450)
POTASSIUM SERPL-SCNC: 4.1 MMOL/L (ref 3.4–5.3)
RBC # BLD AUTO: 4.59 10E6/UL (ref 3.8–5.2)
SODIUM SERPL-SCNC: 142 MMOL/L (ref 135–145)
TRIGL SERPL-MCNC: 168 MG/DL
VIT D+METAB SERPL-MCNC: 50 NG/ML (ref 20–50)
WBC # BLD AUTO: 5.2 10E3/UL (ref 4–11)

## 2024-05-21 PROCEDURE — 82043 UR ALBUMIN QUANTITATIVE: CPT

## 2024-05-21 PROCEDURE — 83036 HEMOGLOBIN GLYCOSYLATED A1C: CPT

## 2024-05-21 PROCEDURE — 36415 COLL VENOUS BLD VENIPUNCTURE: CPT

## 2024-05-21 PROCEDURE — 85027 COMPLETE CBC AUTOMATED: CPT

## 2024-05-21 PROCEDURE — 80048 BASIC METABOLIC PNL TOTAL CA: CPT

## 2024-05-21 PROCEDURE — 83690 ASSAY OF LIPASE: CPT

## 2024-05-21 PROCEDURE — 82306 VITAMIN D 25 HYDROXY: CPT

## 2024-05-21 PROCEDURE — 80061 LIPID PANEL: CPT

## 2024-05-21 PROCEDURE — 82570 ASSAY OF URINE CREATININE: CPT

## 2024-05-21 PROCEDURE — 83001 ASSAY OF GONADOTROPIN (FSH): CPT

## 2024-05-24 ENCOUNTER — OFFICE VISIT (OUTPATIENT)
Dept: FAMILY MEDICINE | Facility: CLINIC | Age: 54
End: 2024-05-24
Payer: COMMERCIAL

## 2024-05-24 VITALS
HEART RATE: 57 BPM | HEIGHT: 64 IN | SYSTOLIC BLOOD PRESSURE: 148 MMHG | OXYGEN SATURATION: 99 % | RESPIRATION RATE: 17 BRPM | DIASTOLIC BLOOD PRESSURE: 106 MMHG | BODY MASS INDEX: 29.01 KG/M2 | WEIGHT: 169.9 LBS | TEMPERATURE: 97.5 F

## 2024-05-24 DIAGNOSIS — R73.01 ELEVATED FASTING GLUCOSE: ICD-10-CM

## 2024-05-24 DIAGNOSIS — I10 BENIGN ESSENTIAL HYPERTENSION: ICD-10-CM

## 2024-05-24 DIAGNOSIS — F41.1 GAD (GENERALIZED ANXIETY DISORDER): ICD-10-CM

## 2024-05-24 DIAGNOSIS — R25.1 TREMOR: ICD-10-CM

## 2024-05-24 DIAGNOSIS — Z98.84 S/P GASTRIC BYPASS: ICD-10-CM

## 2024-05-24 DIAGNOSIS — E66.3 OVERWEIGHT: ICD-10-CM

## 2024-05-24 DIAGNOSIS — Z00.00 ROUTINE GENERAL MEDICAL EXAMINATION AT A HEALTH CARE FACILITY: Primary | ICD-10-CM

## 2024-05-24 DIAGNOSIS — G43.709 CHRONIC MIGRAINE WITHOUT AURA WITHOUT STATUS MIGRAINOSUS, NOT INTRACTABLE: ICD-10-CM

## 2024-05-24 DIAGNOSIS — E67.3 HYPERVITAMINOSIS D: ICD-10-CM

## 2024-05-24 DIAGNOSIS — A60.00 RECURRENT GENITAL HERPES: ICD-10-CM

## 2024-05-24 PROCEDURE — 99214 OFFICE O/P EST MOD 30 MIN: CPT | Mod: 25 | Performed by: FAMILY MEDICINE

## 2024-05-24 PROCEDURE — 99396 PREV VISIT EST AGE 40-64: CPT | Performed by: FAMILY MEDICINE

## 2024-05-24 RX ORDER — LOSARTAN POTASSIUM AND HYDROCHLOROTHIAZIDE 25; 100 MG/1; MG/1
1 TABLET ORAL EVERY MORNING
Qty: 90 TABLET | Refills: 1 | Status: SHIPPED | OUTPATIENT
Start: 2024-05-24

## 2024-05-24 RX ORDER — VALACYCLOVIR HYDROCHLORIDE 500 MG/1
500 TABLET, FILM COATED ORAL DAILY
Qty: 90 TABLET | Refills: 3 | Status: SHIPPED | OUTPATIENT
Start: 2024-05-24

## 2024-05-24 RX ORDER — PROPRANOLOL HYDROCHLORIDE 20 MG/1
20 TABLET ORAL
Qty: 90 TABLET | Refills: 3 | Status: SHIPPED | OUTPATIENT
Start: 2024-05-24

## 2024-05-24 RX ORDER — SUMATRIPTAN 25 MG/1
25 TABLET, FILM COATED ORAL
Qty: 18 TABLET | Refills: 1 | Status: SHIPPED | OUTPATIENT
Start: 2024-05-24

## 2024-05-24 RX ORDER — FLUOXETINE 40 MG/1
40 CAPSULE ORAL DAILY
Qty: 90 CAPSULE | Refills: 1 | Status: SHIPPED | OUTPATIENT
Start: 2024-05-24

## 2024-05-24 SDOH — HEALTH STABILITY: PHYSICAL HEALTH: ON AVERAGE, HOW MANY DAYS PER WEEK DO YOU ENGAGE IN MODERATE TO STRENUOUS EXERCISE (LIKE A BRISK WALK)?: 5 DAYS

## 2024-05-24 ASSESSMENT — SOCIAL DETERMINANTS OF HEALTH (SDOH): HOW OFTEN DO YOU GET TOGETHER WITH FRIENDS OR RELATIVES?: MORE THAN THREE TIMES A WEEK

## 2024-05-24 ASSESSMENT — PAIN SCALES - GENERAL: PAINLEVEL: NO PAIN (0)

## 2024-05-24 NOTE — PROGRESS NOTES
Preventive Care Visit  [facility]  Marcos Ko MD, Family Medicine  May 24, 2024      Assessment & Plan     Routine general medical examination at a health care facility  Discussed on regular exercises, daily calcium intake, healthy eating, and routine dental checks    Benign essential hypertension  BP Readings from Last 6 Encounters:   05/24/24 (!) 148/106   03/05/24 137/84   02/01/24 (!) 143/86   01/29/24 126/89   12/20/23 120/80   11/08/23 (!) 147/91     Blood pressure is not at goal, reviewed elevated home blood pressure readings  Patient is currently taking losartan 50 mg once daily  Recommended to stop taking losartan and start on Hyzaar  Follow-up for blood pressure recheck with the staff along with BMP labs  Continue with efforts on healthy eating, regular exercises, low-salt diet  Dosing and potential medication side effects discussed.  Patient verbalised understanding and is agreeable to the plan.    - losartan-hydrochlorothiazide (HYZAAR) 100-25 MG tablet; Take 1 tablet by mouth every morning  - **Basic metabolic panel FUTURE 14d; Future    Hypervitaminosis D  Reviewed normalized vitamin D, continue to hold vitamin D supplements    TERRANCE (generalized anxiety disorder)  Stable, continue with current dose of Prozac recheck in 6 months or sooner if needed,  - FLUoxetine (PROZAC) 40 MG capsule; Take 1 capsule (40 mg) by mouth daily Profile Rx  - propranolol (INDERAL) 20 MG tablet; Take 1 tablet (20 mg) by mouth nightly as needed (tremor, anxiety,)    Chronic migraine without aura without status migrainosus, not intractable  Needing improvement, likely complicated with uncontrolled hypertension  Continue the Inderal for prophylaxis and Imitrex as needed  Follow-up in 6 months or sooner if needed  - propranolol (INDERAL) 20 MG tablet; Take 1 tablet (20 mg) by mouth nightly as needed (tremor, anxiety,)  - SUMAtriptan (IMITREX) 25 MG tablet; Take 1 tablet (25 mg) by mouth at  "onset of headache for migraine May repeat in 2 hours. Max 8 tablets/24 hours.    Tremor  Stable, continue with propranolol daily  - propranolol (INDERAL) 20 MG tablet; Take 1 tablet (20 mg) by mouth nightly as needed (tremor, anxiety,)    Recurrent genital herpes  Reviewed normal creatinine  Continue with Valtrex daily  Creatinine   Date Value Ref Range Status   05/21/2024 0.76 0.51 - 0.95 mg/dL Final   11/17/2016 0.69 0.52 - 1.04 mg/dL Final       - valACYclovir (VALTREX) 500 MG tablet; Take 1 tablet (500 mg) by mouth daily    S/P gastric bypass  Patient is concerned with her inability to lose weight despite eating healthy and exercising  Referral made for comprehensive weight management   continue with the dietary services      - Adult Comprehensive Weight Management  Referral; Future    Overweight  as above    - Adult Comprehensive Weight Management  Referral; Future    Elevated fasting glucose  Lab Results   Component Value Date    A1C 5.5 05/21/2024    A1C 5.4 11/08/2023    A1C 5.3 11/17/2016    A1C 5.6 04/13/2016    A1C 5.9 08/17/2015    A1C 7.3 05/21/2015    A1C 7.1 02/20/2015     Glucose   Date Value Ref Range Status   05/21/2024 110 (H) 70 - 99 mg/dL Final   12/20/2023 178 (H) 70 - 99 mg/dL Final   11/08/2023 94 70 - 99 mg/dL Final   04/17/2023 100 (H) 70 - 99 mg/dL Final   11/17/2016 92 70 - 99 mg/dL Final     Comment:     Fasting specimen   04/13/2016 90 70 - 99 mg/dL Final   08/17/2015 98 70 - 99 mg/dL Final   05/31/2015 148 (H) 70 - 99 mg/dL Final   05/28/2015 163 (H) 70 - 99 mg/dL Final     Reviewed elevated fasting blood sugar and normal A1c  Continue with healthy eating, regular exercises, recheck in 1 year or sooner if needed.            BMI  Estimated body mass index is 28.94 kg/m  as calculated from the following:    Height as of this encounter: 1.632 m (5' 4.25\").    Weight as of this encounter: 77.1 kg (169 lb 14.4 oz).   Weight management plan: Patient referred to endocrine " and/or weight management specialty    Counseling  Appropriate preventive services were discussed with this patient, including applicable screening as appropriate for fall prevention, nutrition, physical activity, Tobacco-use cessation, weight loss and cognition.  Checklist reviewing preventive services available has been given to the patient.  Reviewed patient's diet, addressing concerns and/or questions.       Regular exercise  Chart documentation done in part with Dragon Voice recognition Software. Although reviewed after completion, some word and grammatical error may remain.    See Patient Instructions    Dayanara Ramesh is a 54 year old, presenting for the following:  Physical (Annual exam. BP has been really high lately, had migraine yesterday. Brought home BP monitor to compare readings)        5/24/2024     7:17 AM   Additional Questions   Roomed by Alexsandra ZARATE   Accompanied by Self         5/24/2024     7:17 AM   Patient Reported Additional Medications   Patient reports taking the following new medications None        Health Care Directive  Patient has a Health Care Directive on file  Advance care planning document is on file and is current.    HPI      Hypertension Follow-up    Do you check your blood pressure regularly outside of the clinic? Yes   Are you following a low salt diet? Yes  Are your blood pressures ever more than 140 on the top number (systolic) OR more   than 90 on the bottom number (diastolic), for example 140/90? Yes    Anxiety   How are you doing with your anxiety since your last visit? No change  Are you having other symptoms that might be associated with anxiety? No  Have you had a significant life event? No   Are you feeling depressed? No  Do you have any concerns with your use of alcohol or other drugs? No    Social History     Tobacco Use    Smoking status: Never    Smokeless tobacco: Never   Vaping Use    Vaping status: Never Used   Substance Use Topics    Alcohol use: No    Drug  use: No         2/24/2014     9:14 AM 8/17/2015     1:57 PM   TERRANCE-7 SCORE   Total Score 3 3          No data to display                   No data to display                   No data to display              Migraine   Since your last clinic visit, how have your headaches changed?  Worsened  How often are you getting headaches or migraines?  More frequently lately  Are you able to do normal daily activities when you have a migraine? Yes  Are you taking rescue/relief medications? (Select all that apply) sumatriptan (Imitrex)  How helpful is your rescue/relief medication?  I get some relief  Are you taking any medications to prevent migraines? (Select all that apply)  Inderal  In the past 4 weeks, how often have you gone to urgent care or the emergency room because of your headaches?  0  Medication Followup of Valtrex  Taking Medication as prescribed: yes  Side Effects:  None  Medication Helping Symptoms:  yes        5/24/2024   General Health   How would you rate your overall physical health? (!) FAIR   Feel stress (tense, anxious, or unable to sleep) To some extent   (!) STRESS CONCERN      5/24/2024   Nutrition   Three or more servings of calcium each day? Yes   Diet: Low salt    Low fat/cholesterol    Carbohydrate counting   How many servings of fruit and vegetables per day? 4 or more   How many sweetened beverages each day? 0-1         5/24/2024   Exercise   Days per week of moderate/strenous exercise 5 days         5/24/2024   Social Factors   Frequency of gathering with friends or relatives More than three times a week   Worry food won't last until get money to buy more No   Food not last or not have enough money for food? No   Do you have housing?  Yes   Are you worried about losing your housing? No   Lack of transportation? No   Unable to get utilities (heat,electricity)? No         5/24/2024   Fall Risk   Fallen 2 or more times in the past year? No   Trouble with walking or balance? No          5/24/2024    Dental   Dentist two times every year? Yes         5/24/2024   TB Screening   Were you born outside of the [place]? Yes           Today's PHQ-2 Score:       1/15/2024     8:25 AM   PHQ-2 ( 1999 [employer])   Q1: Little interest or pleasure in doing things 0   Q2: Feeling down, depressed or hopeless 0   PHQ-2 Score 0   Q1: Little interest or pleasure in doing things Not at all   Q2: Feeling down, depressed or hopeless Not at all   PHQ-2 Score 0         5/24/2024   Substance Use   Alcohol more than 3/day or more than 7/wk Not Applicable   Do you use any other substances recreationally? No     Social History     Tobacco Use    Smoking status: Never    Smokeless tobacco: Never   Vaping Use    Vaping status: Never Used   Substance Use Topics    Alcohol use: No    Drug use: No             5/24/2024   Breast Cancer Screening   Family history of breast, colon, or ovarian cancer? No / Unknown         6/8/2023   LAST FHS-7 RESULTS   1st degree relative breast or ovarian cancer No   Any relative bilateral breast cancer No   Any male have breast cancer No   Any ONE woman have BOTH breast AND ovarian cancer No   Any woman with breast cancer before 50yrs No   2 or more relatives with breast AND/OR ovarian cancer No   2 or more relatives with breast AND/OR bowel cancer No        Mammogram Screening - Mammogram every 1-2 years updated in Health Maintenance based on mutual decision making        5/24/2024   STI Screening   New sexual partner(s) since last STI/HIV test? (!) YES      History of abnormal Pap smear: No - age 30- 64 PAP with HPV every 5 years recommended        Latest Ref Rng & Units 5/5/2022     4:22 PM 12/19/2016     4:17 PM 12/19/2016    12:00 AM   PAP / HPV   PAP (Historical)    NIL    HPV 16 DNA NEG  Negative     HPV 18 DNA NEG  Negative     Other HR HPV NEG  Negative     PAP-ABSTRACT  See Scanned Document             This result is from an external source.     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    Fatigue 01/27/2015    Hypertension     Lateral epicondylitis 11/21/2013    Sleep apnea     uses cpap    Surgical complications      Past Surgical History:   Procedure Laterality Date    ABDOMINOPLASTY  12/16/2022    COLONOSCOPY N/A 2/1/2024    Procedure: COLONOSCOPY, WITH POLYPECTOMY AND BIOPSY;  Surgeon: Josephine Quijano MD;  Location: [facility] OR    COLONOSCOPY WITH CO2 INSUFFLATION N/A 2/1/2024    Procedure: Colonoscopy with CO2 insufflation;  Surgeon: Josephine Quijano MD;  Location: [facility] OR    FOOT SURGERY      HERNIA REPAIR      LAPAROSCOPIC BYPASS GASTRIC N/A 05/28/2015    Procedure: LAPAROSCOPIC BYPASS GASTRIC;  Surgeon: Eulalio Loza MD;  Location:  OR    LASIK Right     LIVER BIOPSY      ZC STOMACH SURGERY PROCEDURE UNLISTED       OB History   No obstetric history on file.     Lab work is in process  Labs reviewed in EPIC  BP Readings from Last 3 Encounters:   05/24/24 (!) 148/106   03/05/24 137/84   02/01/24 (!) 143/86    Wt Readings from Last 3 Encounters:   05/24/24 77.1 kg (169 lb 14.4 oz)   11/08/23 77.2 kg (170 lb 4.8 oz)   05/03/23 74.7 kg (164 lb 11.2 oz)                  Patient Active Problem List   Diagnosis    TERRANCE (generalized anxiety disorder)    S/P epigastric hernia repair    Benign essential hypertension    Migraine    Hypokalemia    Recurrent genital herpes    Snoring    Fatigue    Encounter for Essure implantation    Stress incontinence    Plantar fasciitis    Bariatric surgery status    Hx of LASIK     No diabetic retinopathy in both eyes    S/P gastric bypass    KANDIS (obstructive sleep apnea)    Hypertriglyceridemia    Knee pain, unspecified laterality    Stress    Tendinitis of right wrist    BMI 27.0-27.9,adult    Postsurgical malabsorption    H/O diabetes mellitus    Hyperlipidemia LDL goal <100    History of hypertension    Dizziness    Tremor    Dumping syndrome    Chronic diarrhea     Past Surgical History:   Procedure Laterality Date    ABDOMINOPLASTY  12/16/2022    COLONOSCOPY N/A 2/1/2024    Procedure: COLONOSCOPY, WITH POLYPECTOMY AND BIOPSY;  Surgeon: Josephine Quijano MD;  Location: [facility] OR    COLONOSCOPY WITH CO2 INSUFFLATION N/A 2/1/2024    Procedure: Colonoscopy with CO2 insufflation;  Surgeon: Josephine Quijano MD;  Location: [facility] OR    FOOT SURGERY      HERNIA REPAIR      LAPAROSCOPIC BYPASS GASTRIC N/A 05/28/2015    Procedure: LAPAROSCOPIC BYPASS GASTRIC;  Surgeon: Elualio Loza MD;  Location: [facility] OR    LASIK Right     LIVER BIOPSY      ZZC STOMACH SURGERY PROCEDURE UNLISTED         Social History     Tobacco Use    Smoking status: Never    Smokeless tobacco: Never   Substance Use Topics    Alcohol use: No     Family History   Problem Relation Age of Onset    Hypertension Maternal Grandmother     Cancer Sister         liver cancer    Thyroid Disease Sister     Thyroid Disease Mother     Cerebrovascular Disease Paternal Uncle     Glaucoma No family hx of     Macular Degeneration No family hx of     Diabetes Paternal Grandmother     Diabetes Other          Current Outpatient Medications   Medication Sig Dispense Refill    FLUoxetine (PROZAC) 40 MG capsule Take 1 capsule (40 mg) by mouth daily Profile Rx 90 capsule 1    losartan-hydrochlorothiazide (HYZAAR) 100-25 MG tablet Take 1 tablet by mouth every morning 90 tablet 1    propranolol (INDERAL) 20 MG tablet Take 1 tablet (20 mg) by mouth nightly as needed (tremor, anxiety,) 90 tablet 3    SUMAtriptan (IMITREX) 25 MG tablet Take 1 tablet (25  mg) by mouth at onset of headache for migraine May repeat in 2 hours. Max 8 tablets/24 hours. 18 tablet 1    valACYclovir (VALTREX) 500 MG tablet Take 1 tablet (500 mg) by mouth daily 90 tablet 3    Acetaminophen (TYLENOL PO) Take 325 mg by mouth every 6 hours as needed for mild pain or fever      Ascorbic Acid (VITAMIN C PO) Take 500 mg by mouth every morning       Calcium Carbonate-Vitamin D (CALCIUM + D PO) Take 600 mg by mouth 2 times daily (with meals)      Cyanocobalamin (B-12 PO) Take 1 drop by mouth every morning 1000 mcg per drop      Ferrous Sulfate (IRON SUPPLEMENT PO) Take 65 mg by mouth every morning      hypromellose-dextran (HYPROMELLOSE-DEXTRAN 0.3-0.1%) 0.1-0.3 % ophthalmic solution Place 1 drop into both eyes daily as needed      lifitegrast (XIIDRA) 5 % opthalmic solution Place 1 drop into both eyes 2 times daily 60 each 11    Multiple Vitamins-Minerals (MULTIVITAMIN PO) Take 2 tablets by mouth every morning       order for DME Equipment being ordered: right wrist splint- 1 Units 0    Vitamin D, Cholecalciferol, 25 MCG (1000 UT) CAPS Take 2,000 Units by mouth every morning       Allergies   Allergen Reactions    Succinylcholine Other (See Comments)     Unable to wake after ankle surgery    Vicodin [Hydrocodone-Acetaminophen] Hives    Lisinopril      Dry cough    Penicillins      Unknown reaction in childhood     Recent Labs   Lab Test 05/21/24  0851 12/20/23  1531 11/08/23  1651 04/17/23  1456 04/17/23  1456 12/19/16  1629 11/17/16  0724 04/13/16  1543   A1C 5.5  --  5.4  --   --   --  5.3 5.6   *  --  119*  --   --   --  90  --    HDL 64  --  58  --   --   --  63  --    TRIG 168*  --  240*  --   --   --  126  --    ALT  --  42 48  --  76*  --   --  46   CR 0.76 0.99* 0.70   < > 0.70  --  0.69 0.64   GFRESTIMATED >90 68 >90   < > >90  --  >90  Non  GFR Calc   >90  Non  GFR Calc     GFRESTBLACK  --   --   --   --   --   --  >90  African American GFR Calc    ">90   GFR Calc     POTASSIUM 4.1 4.0 4.1   < > 4.0  --  3.9 3.6   TSH  --   --   --   --  2.08 1.79  --  2.12    < > = values in this interval not displayed.          Review of Systems  WillCONSTITUTIONAL: NEGATIVE for fever, chills, change in weight  INTEGUMENTARY/SKIN: NEGATIVE for worrisome rashes, moles or lesions  EYES: NEGATIVE for vision changes or irritation  ENT/MOUTH: NEGATIVE for ear, mouth and throat problems  RESP: NEGATIVE for significant cough or SOB  BREAST: NEGATIVE for masses, tenderness or discharge  CV: Hx HTN  GI: NEGATIVE for nausea, abdominal pain, heartburn, or change in bowel habits  : NEGATIVE for frequency, dysuria, or hematuria  MUSCULOSKELETAL: NEGATIVE for significant arthralgias or myalgia  NEURO: Hx headaches-migraine  ENDOCRINE: NEGATIVE for temperature intolerance, skin/hair changes  HEME: NEGATIVE for bleeding problems  PSYCHIATRIC: HX anxiety     Objective    Exam  BP (!) 148/106 (BP Location: Left arm, Patient Position: Sitting, Cuff Size: Adult Large)   Pulse 57   Temp 97.5  F (36.4  C) (Temporal)   Resp 17   Ht 1.632 m (5' 4.25\")   Wt 77.1 kg (169 lb 14.4 oz)   LMP 05/15/2023 (Approximate)   SpO2 99%   BMI 28.94 kg/m     Estimated body mass index is 28.94 kg/m  as calculated from the following:    Height as of this encounter: 1.632 m (5' 4.25\").    Weight as of this encounter: 77.1 kg (169 lb 14.4 oz).    Physical Exam  GENERAL: alert and no distress  EYES: Eyes grossly normal to inspection, PERRL and conjunctivae and sclerae normal  HENT: ear canals and TM's normal, nose and mouth without ulcers or lesions  NECK: no adenopathy, no asymmetry, masses, or scars  RESP: lungs clear to auscultation - no rales, rhonchi or wheezes  CV: regular rate and rhythm, normal S1 S2, no S3 or S4, no murmur, click or rub, no peripheral edema  ABDOMEN: soft, nontender, no hepatosplenomegaly, no masses and bowel sounds normal  MS: no gross musculoskeletal defects noted, " no edema  SKIN: no suspicious lesions or rashes  NEURO: Normal strength and tone, mentation intact and speech normal  PSYCH: mentation appears normal, affect normal/bright        Signed Electronically by: Marcos Ko MD

## 2024-05-24 NOTE — PATIENT INSTRUCTIONS
"Preventive Care Advice   This is general advice we often give to help people stay healthy. Your care team may have specific advice just for you. Please talk to your care team about your own preventive care needs.  Lifestyle  Exercise at least 150 minutes each week (30 minutes a day, 5 days a week).  Do muscle strengthening activities 2 days a week. These help control your weight and prevent disease.  No smoking.  Wear sunscreen to prevent skin cancer.  Have your home tested for radon every 2 to 5 years. Radon is a colorless, odorless gas that can harm your lungs. To learn more, go to www.health.[facility]. and search for \"Radon in Homes.\"  Keep guns unloaded and locked up in a safe place like a safe or gun vault, or, use a gun lock and hide the keys. Always lock away bullets separately. To learn more, visit [employer].gov and search for \"safe gun storage.\"  Nutrition  Eat 5 or more servings of fruits and vegetables each day.  Try wheat bread, brown rice and whole grain pasta (instead of white bread, rice, and pasta).  Get enough calcium and vitamin D. Check the label on foods and aim for 100% of the RDA (recommended daily allowance).  Regular exams  Have a dental exam and cleaning every 6 months.  See your health care team every year to talk about:  Any changes in your health.  Any medicines your care team has prescribed.  Preventive care, family planning, and ways to prevent chronic diseases.  Shots (vaccines)   HPV shots (up to age 26), if you've never had them before.  Hepatitis B shots (up to age 59), if you've never had them before.  COVID-19 shot: Get this shot when it's due.  Flu shot: Get a flu shot every year.  Tetanus shot: Get a tetanus shot every 10 years.  Pneumococcal, hepatitis A, and RSV shots: Ask your care team if you need these based on your risk.  Shingles shot (for age 50 and up).  General health tests  Diabetes screening:  Starting at age 35, Get screened for diabetes at least every 3 years.  If " you are younger than age 35, ask your care team if you should be screened for diabetes.  Cholesterol test: At age 39, start having a cholesterol test every 5 years, or more often if advised.  Bone density scan (DEXA): At age 50, ask your care team if you should have this scan for osteoporosis (brittle bones).  Hepatitis C: Get tested at least once in your life.  Abdominal aortic aneurysm screening: Talk to your doctor about having this screening if you:  Have ever smoked; and  Are biologically male; and  Are between the ages of 65 and 75.  STIs (sexually transmitted infections)  Before age 24: Ask your care team if you should be screened for STIs.  After age 24: Get screened for STIs if you're at risk. You are at risk for STIs (including HIV) if:  You are sexually active with more than one person.  You don't use condoms every time.  You or a partner was diagnosed with a sexually transmitted infection.  If you are at risk for HIV, ask about PrEP medicine to prevent HIV.  Get tested for HIV at least once in your life, whether you are at risk for HIV or not.  Cancer screening tests  Cervical cancer screening: If you have a cervix, begin getting regular cervical cancer screening tests at age 21. Most people who have regular screenings with normal results can stop after age 65. Talk about this with your provider.  Breast cancer scan (mammogram): If you've ever had breasts, begin having regular mammograms starting at age 40. This is a scan to check for breast cancer.  Colon cancer screening: It is important to start screening for colon cancer at age 45.  Have a colonoscopy test every 10 years (or more often if you're at risk) Or, ask your provider about stool tests like a FIT test every year or Cologuard test every 3 years.  To learn more about your testing options, visit: www.Rank By Search/963687.pdf.  For help making a decision, visit: eusebia/qb70616.  Prostate cancer screening test: If you have a prostate and are age 55  to 69, ask your provider if you would benefit from a yearly prostate cancer screening test.  Lung cancer screening: If you are a current or former smoker age 50 to 80, ask your care team if ongoing lung cancer screenings are right for you.  For informational purposes only. Not to replace the advice of your health care provider. Copyright   2023 [employer]. All rights reserved. Clinically reviewed by the [facility]. GoldenSUN [id number] - REV 04/24.    Learning About Stress  What is stress?     Stress is your body's response to a hard situation. Your body can have a physical, emotional, or mental response. Stress is a fact of life for most people, and it affects everyone differently. What causes stress for you may not be stressful for someone else.  A lot of things can cause stress. You may feel stress when you go on a job interview, take a test, or run a race. This kind of short-term stress is normal and even useful. It can help you if you need to work hard or react quickly. For example, stress can help you finish an important job on time.  Long-term stress is caused by ongoing stressful situations or events. Examples of long-term stress include long-term health problems, ongoing problems at work, or conflicts in your family. Long-term stress can harm your health.  How does stress affect your health?  When you are stressed, your body responds as though you are in danger. It makes hormones that speed up your heart, make you breathe faster, and give you a burst of energy. This is called the fight-or-flight stress response. If the stress is over quickly, your body goes back to normal and no harm is done.  But if stress happens too often or lasts too long, it can have bad effects. Long-term stress can make you more likely to get sick, and it can make symptoms of some diseases worse. If you tense up when you are stressed, you may develop neck, shoulder, or low back pain. Stress is  linked to high blood pressure and heart disease.  Stress also harms your emotional health. It can make you hernández, tense, or depressed. Your relationships may suffer, and you may not do well at work or school.  What can you do to manage stress?  You can try these things to help manage stress:   Do something active. Exercise or activity can help reduce stress. Walking is a great way to get started. Even everyday activities such as housecleaning or yard work can help.  Try yoga or pravin chi. These techniques combine exercise and meditation. You may need some training at first to learn them.  Do something you enjoy. For example, listen to music or go to a movie. Practice your hobby or do volunteer work.  Meditate. This can help you relax, because you are not worrying about what happened before or what may happen in the future.  Do guided imagery. Imagine yourself in any setting that helps you feel calm. You can use online videos, books, or a teacher to guide you.  Do breathing exercises. For example:  From a standing position, bend forward from the waist with your knees slightly bent. Let your arms dangle close to the floor.  Breathe in slowly and deeply as you return to a standing position. Roll up slowly and lift your head last.  Hold your breath for just a few seconds in the standing position.  Breathe out slowly and bend forward from the waist.  Let your feelings out. Talk, laugh, cry, and express anger when you need to. Talking with supportive friends or family, a counselor, or a rebekah leader about your feelings is a healthy way to relieve stress. Avoid discussing your feelings with people who make you feel worse.  Write. It may help to write about things that are bothering you. This helps you find out how much stress you feel and what is causing it. When you know this, you can find better ways to cope.  What can you do to prevent stress?  You might try some of these things to help prevent stress:  Manage your time.  "This helps you find time to do the things you want and need to do.  Get enough sleep. Your body recovers from the stresses of the day while you are sleeping.  Get support. Your family, friends, and community can make a difference in how you experience stress.  Limit your news feed. Avoid or limit time on social media or news that may make you feel stressed.  Do something active. Exercise or activity can help reduce stress. Walking is a great way to get started.  Where can you learn more?  Go to https://www.Xam[employer].net/patiented  Enter N032 in the search box to learn more about \"Learning About Stress.\"  Current as of: October 24, 2023               Content Version: 14.0    4591-2946 wmbly.   Care instructions adapted under license by your healthcare professional. If you have questions about a medical condition or this instruction, always ask your healthcare professional. [employer] disclaims any warranty or liability for your use of this information.      Safer Sex: Care Instructions  Overview  Safer sex is a way to reduce your risk of getting a sexually transmitted infection (STI). It can also help prevent pregnancy.  Several products can help you practice safer sex and reduce your chance of STIs. One of the best is a condom. There are internal and external condoms. You can use a special rubber sheet (dental dam) for protection during oral sex. Disposable gloves can keep your hands from touching blood, semen, or other body fluids that can carry infections.  Remember that birth control methods such as diaphragms, IUDs, foams, and birth control pills do not stop you from getting STIs.  Follow-up care is a key part of your treatment and safety. Be sure to make and go to all appointments, and call your doctor if you are having problems. It's also a good idea to know your test results and keep a list of the medicines you take.  How can you care for yourself at home?  Think about " "getting vaccinated to help prevent hepatitis A, hepatitis B, and human papillomavirus (HPV). They can be spread through sex.  Use a condom every time you have sex. Use an external condom, which goes on the penis. Or use an internal condom, which goes into the vagina or anus.  Make sure you use the right size external condom. A condom that's too small can break easily. A condom that's too big can slip off during sex.  Use a new condom each time you have sex. Be careful not to poke a hole in the condom when you open the wrapper.  Don't use an internal condom and an external condom at the same time.  Never use petroleum jelly (such as Vaseline), grease, hand lotion, baby oil, or anything with oil in it. These products can make holes in the condom.  After intercourse, hold the edge of the condom as you remove it. This will help keep semen from spilling out of the condom.  Do not have sex with anyone who has symptoms of an STI, such as sores on the genitals or mouth.  Do not drink a lot of alcohol or use drugs before sex.  Limit your sex partners. Sex with one partner who has sex only with you can reduce your risk of getting an STI.  Don't share sex toys. But if you do share them, use a condom and clean the sex toys between each use.  Talk to any partners before you have sex. Talk about what you feel comfortable with and whether you have any boundaries with sex. And find out if your partner or partners may be at risk for any STI. Keep in mind that a person may be able to spread an STI even if they do not have symptoms. You and any partners may want to get tested for STIs.  Where can you learn more?  Go to https://www.healthPost-A-Vox.net/patiented  Enter B608 in the search box to learn more about \"Safer Sex: Care Instructions.\"  Current as of: November 27, 2023               Content Version: 14.0    7017-2841 [employer].   Care instructions adapted under license by your healthcare professional. If you have " questions about a medical condition or this instruction, always ask your healthcare professional. Healthwise, Incorporated disclaims any warranty or liability for your use of this information.

## 2024-06-12 ENCOUNTER — LAB (OUTPATIENT)
Dept: LAB | Facility: CLINIC | Age: 54
End: 2024-06-12
Payer: COMMERCIAL

## 2024-06-12 ENCOUNTER — ALLIED HEALTH/NURSE VISIT (OUTPATIENT)
Dept: FAMILY MEDICINE | Facility: CLINIC | Age: 54
End: 2024-06-12
Payer: COMMERCIAL

## 2024-06-12 VITALS
TEMPERATURE: 97.7 F | RESPIRATION RATE: 14 BRPM | OXYGEN SATURATION: 99 % | HEART RATE: 72 BPM | DIASTOLIC BLOOD PRESSURE: 83 MMHG | SYSTOLIC BLOOD PRESSURE: 126 MMHG

## 2024-06-12 DIAGNOSIS — I10 BENIGN ESSENTIAL HYPERTENSION: ICD-10-CM

## 2024-06-12 DIAGNOSIS — I15.8 OTHER SECONDARY HYPERTENSION: Primary | ICD-10-CM

## 2024-06-12 LAB
ANION GAP SERPL CALCULATED.3IONS-SCNC: 15 MMOL/L (ref 7–15)
BUN SERPL-MCNC: 15.5 MG/DL (ref 6–20)
CALCIUM SERPL-MCNC: 10 MG/DL (ref 8.6–10)
CHLORIDE SERPL-SCNC: 102 MMOL/L (ref 98–107)
CREAT SERPL-MCNC: 0.79 MG/DL (ref 0.51–0.95)
DEPRECATED HCO3 PLAS-SCNC: 23 MMOL/L (ref 22–29)
EGFRCR SERPLBLD CKD-EPI 2021: 88 ML/MIN/1.73M2
GLUCOSE SERPL-MCNC: 130 MG/DL (ref 70–99)
POTASSIUM SERPL-SCNC: 3.9 MMOL/L (ref 3.4–5.3)
SODIUM SERPL-SCNC: 140 MMOL/L (ref 135–145)

## 2024-06-12 PROCEDURE — 99207 PR NO CHARGE NURSE ONLY: CPT

## 2024-06-12 PROCEDURE — 80048 BASIC METABOLIC PNL TOTAL CA: CPT

## 2024-06-12 PROCEDURE — 36415 COLL VENOUS BLD VENIPUNCTURE: CPT

## 2024-06-12 ASSESSMENT — PAIN SCALES - GENERAL: PAINLEVEL: NO PAIN (0)

## 2024-06-12 NOTE — PROGRESS NOTES
BP Readings from Last 6 Encounters:   06/12/24 126/83   05/24/24 (!) 148/106   03/05/24 137/84   02/01/24 (!) 143/86   01/29/24 126/89   12/20/23 120/80     Blood pressure is at goal, will continue with current medications with no change     DC instructions

## 2024-06-12 NOTE — NURSING NOTE
Domitila Luo is a 54 year old patient who comes in today for a Blood Pressure check.  Initial BP:  /83 (BP Location: Right arm, Patient Position: Sitting, Cuff Size: Adult Regular)   Pulse 72   Temp 97.7  F (36.5  C) (Temporal)   Resp 14   LMP 05/15/2023 (Approximate)   SpO2 99%      72  Disposition: results routed to provider    June De Santiago MA

## 2024-06-14 ENCOUNTER — ANCILLARY PROCEDURE (OUTPATIENT)
Dept: MAMMOGRAPHY | Facility: CLINIC | Age: 54
End: 2024-06-14
Attending: FAMILY MEDICINE
Payer: COMMERCIAL

## 2024-06-14 DIAGNOSIS — Z12.31 VISIT FOR SCREENING MAMMOGRAM: ICD-10-CM

## 2024-06-14 PROCEDURE — 77067 SCR MAMMO BI INCL CAD: CPT | Performed by: RADIOLOGY

## 2024-06-14 PROCEDURE — 77063 BREAST TOMOSYNTHESIS BI: CPT | Performed by: RADIOLOGY

## 2024-08-10 NOTE — TELEPHONE ENCOUNTER
PT Wound Care Progress Note        SUBJECTIVE:  Chief Complaint   Patient presents with    Wound     Sacral wound and L BKA incisional nonhealing      HPI 7/31/24: Patient admitted from [facility] s/p L BKA complicated by stroke. PTWC consulted for sacral wound and R heel DTI.      OBJECTIVE:  Pain assessment:  Pain  Pain Assessment Tool: Numeric Rating Scale 0-10  Pain Intensity  Numeric Rating Scale 0-10: 0        Treatment:       08/10/24 0930 08/10/24 0931   Treatment   Wound Location Sacral L BKA   Treatment Type Debridement Compression;Debridement   Compression   Laterality  --  Left   Compression Applied to  --  Other (comment)  (L BKA residual limb)   Compression Used  --  Tubular compression bandage size E  (stockinette, tubigrip)   Debridement   Photo Taken Yes Yes   Wound Cleansing  Dermal wound cleanser Dermal wound cleanser   Type of Tissue Debrided Eschar;Slough  --    Hemostasis Assured by Silver Nitrate;Pressure  --    Topical Product to Periwound Area Avoiding Base Skin Prep Petroleum jelly   Primary Wound Dressing Honey gel;Honey gauze Xeroform gauze   Secondary Wound Dressing Foam dressing Gauze, sterile 4 X 4   Dressing Securement  --  Gauze roll-conforming (e.g. Karie);Stockinette   Debridement Type Sharp selective Non-selective   Sharp Selective    Sharp Selective Debridement Instrument Used Forceps;Scalpel  --    Goals   Goals-Short Term (4-6 weeks) Reduce necrosis by 100% Reduce necrosis by 75%     ASSESSMENT  Pt seen bedside earlier for dressing changes to L BKA and sacrum.  L BKA incision line still has eschar, however no significant drainage at this time and edema does continue to be controlled.  Consider addition of Santyl to help soften eschar as appropriate.  Sacral wound was partially debrided- very thick leathery moist eschar noted, so true depth of wound is unknown.  Performed non-excisional debridement of nonviable tissue to the level of  Left Voicemail (1st Attempt) for the patient to call back and schedule the following:      Specialty phone number: [phone]  Additional appointment(s) needed: hand therapy, emg, and mri     Adrianne lopez Procedure   Orthopedics, Podiatry, Sports Medicine, Ent ,Eye , Audiology, Adult Endocrine & Diabetes, Nutrition & Medication Therapy Management Specialties   [facility]    subcutaneous tissue including eshcar and fibrin. R side appears less deep than the L side.  Pt will benefit from serial debridement, or may consider surgical consult if desired given ongoing necrosis.    Plan  Cont PTWC ~ 2x/week.  F/u again on 8/12.    Jackelyn Abernathy, PT, DPT, CWS  8/10/2024

## 2024-09-10 ENCOUNTER — OFFICE VISIT (OUTPATIENT)
Dept: SURGERY | Facility: CLINIC | Age: 54
End: 2024-09-10
Payer: COMMERCIAL

## 2024-09-10 VITALS — HEART RATE: 65 BPM | OXYGEN SATURATION: 97 % | BODY MASS INDEX: 27.83 KG/M2 | HEIGHT: 64 IN | WEIGHT: 163 LBS

## 2024-09-10 DIAGNOSIS — E66.3 OVERWEIGHT WITH BODY MASS INDEX (BMI) OF 27 TO 27.9 IN ADULT: Primary | ICD-10-CM

## 2024-09-10 DIAGNOSIS — K91.2 POSTSURGICAL MALABSORPTION: ICD-10-CM

## 2024-09-10 DIAGNOSIS — Z98.84 BARIATRIC SURGERY STATUS: ICD-10-CM

## 2024-09-10 PROCEDURE — 99204 OFFICE O/P NEW MOD 45 MIN: CPT | Performed by: PHYSICIAN ASSISTANT

## 2024-09-10 RX ORDER — PHENTERMINE HYDROCHLORIDE 15 MG/1
15 CAPSULE ORAL EVERY MORNING
Qty: 60 CAPSULE | Refills: 0 | Status: SHIPPED | OUTPATIENT
Start: 2024-09-10

## 2024-09-10 NOTE — PROGRESS NOTES
Reestablish Care Return Bariatric Surgery Note      RE: Domitila Luo  MR#: [id number]  : 1970      VISIT DATE: Sep 10, 2024    Dear Marcos Ko,    I had the pleasure of seeing your patient, Domitila Luo, in my post-bariatric surgery assessment clinic.    CHIEF COMPLAINT: Post-bariatric surgery follow-up. Patient last seen 2016. Overall doing well. Weight has remained pretty consistent. Is an avid biker and on average bikes more than 100 miles per week. Primary has been checking some labs.   Does feel a bit concerned as blood pressure has gone up a bit. Had to restart medication. Cholesterol is also up a bit. Thought it would be good to check in with the weight loss clinic.       HISTORY OF PRESENT ILLNESS:      2024     4:27 PM   Questions Regarding Prior Weight Loss Surgery Reviewed With Patient   I had the following weight loss procedure Silvestre-en-y Gastric Bypass   What year was your surgery? 2015   How has your weight changed since your last visit? I have gained weight   Do you currently have any of the following Sleep Apnea    Hypertension (high blood pressure)?    Hyperlipidemia (high cholesterol, triglycerides)?   Do you have any concerns today? Yes, my blood pressure, and cholesterol were corrcted for 7+ years after surgery & they re coming back.I ve gained 10 pounds in the last year, that can t seam to be able to loose back regardless of what I do     Patient is taking the following bariatric postoperative vitamins:  1 One -a-day multivitamin   Vitamin D daily - not sure of amount  calcium once daily - not sure of dose  Stopped Vitamin B12 last year  1 Iron daily    Weight History:      2024     4:27 PM   --   What is your highest lifetime weight? 215   What is your lowest weight since surgery? (In pounds) 158     Initial Weight (lbs): 210 lbs  Weight: 163 lb (73.9 kg)  Last Visits Weight: 162 lb (73.5 kg)  Cumulative weight loss (lbs): 47  Weight Loss Percentage:  22.38%        9/9/2024     4:27 PM   Questions Regarding Co-Morbidities and Health Concerns Reviewed With Patient   Pre-diabetes Gone away   Diabetes II Gone away   High Blood Pressure Worsened   High cholesterol Improved   Heartburn/Reflux Gone away   Sleep apnea Improved   PCOS Never   Back pain Improved   Joint pain Worsened   Lower leg swelling Improved           9/9/2024     4:27 PM   Eating Habits   How many meals do you eat per day? 2   Do you snack between meals? Sometimes   How much food are you eating at each meal? Greater than 1 cup   Are you able to separate your meals and liquids by at least 30 minutes? Yes   Are you able to avoid liquid calories? Yes     Drinks 64 oz water daily         9/9/2024     4:27 PM   Exercise Questions Reviewed With Patient   How often do you exercise? 1 to 2 times per week   What is the duration of your exercise (in minutes)? 45 Minutes   What types of exercise do you do? walking    home gym    climbing stairs at work    other   What keeps you from being more active? Lack of Time     Bikes- indoors and outdoors. Bike 150 mile race x 6 years. Bikes average 100 miles per week. No weight lifting  In the winter has a cycling bike in house       Social History:      9/9/2024     4:27 PM   --   Are you smoking? No   Are you drinking alcohol? No       Medications:  Current Outpatient Medications   Medication Sig Dispense Refill    Acetaminophen (TYLENOL PO) Take 325 mg by mouth every 6 hours as needed for mild pain or fever      Ascorbic Acid (VITAMIN C PO) Take 500 mg by mouth every morning       Calcium Carbonate-Vitamin D (CALCIUM + D PO) Take 600 mg by mouth 2 times daily (with meals)      Cyanocobalamin (B-12 PO) Take 1 drop by mouth every morning 1000 mcg per drop      Ferrous Sulfate (IRON SUPPLEMENT PO) Take 65 mg by mouth every morning      FLUoxetine (PROZAC) 40 MG capsule Take 1 capsule (40 mg) by mouth daily Profile Rx 90 capsule 1    hypromellose-dextran  "(HYPROMELLOSE-DEXTRAN 0.3-0.1%) 0.1-0.3 % ophthalmic solution Place 1 drop into both eyes daily as needed      lifitegrast (XIIDRA) 5 % opthalmic solution Place 1 drop into both eyes 2 times daily 60 each 11    losartan-hydrochlorothiazide (HYZAAR) 100-25 MG tablet Take 1 tablet by mouth every morning 90 tablet 1    Multiple Vitamins-Minerals (MULTIVITAMIN PO) Take 2 tablets by mouth every morning       order for DME Equipment being ordered: right wrist splint- 1 Units 0    phentermine 15 MG capsule Take 1 capsule (15 mg) by mouth every morning. 60 capsule 0    propranolol (INDERAL) 20 MG tablet Take 1 tablet (20 mg) by mouth nightly as needed (tremor, anxiety,) 90 tablet 3    SUMAtriptan (IMITREX) 25 MG tablet Take 1 tablet (25 mg) by mouth at onset of headache for migraine May repeat in 2 hours. Max 8 tablets/24 hours. 18 tablet 1    valACYclovir (VALTREX) 500 MG tablet Take 1 tablet (500 mg) by mouth daily 90 tablet 3    Vitamin D, Cholecalciferol, 25 MCG (1000 UT) CAPS Take 2,000 Units by mouth every morning       No current facility-administered medications for this visit.         9/9/2024     4:27 PM   --   Do you avoid NSAIDs such as (Ibuprofen, Aleve, Naproxen, Advil)? Yes     A cup of coffee in the morning. 16 oz     ROS:  GI:       9/9/2024     4:27 PM   --   Vomiting No   Diarrhea Yes   Constipation Yes   Swallowing trouble No   Abdominal pain Yes   Heartburn No     Was having some diarrhea and constipation. Evaluated by GI. Had colonoscopy and all normal. Was bad for a few months. Better now.     Skin:       9/9/2024     4:27 PM   BAR RBS ROS - SKIN   Rash in skin folds No     Psych:       9/9/2024     4:27 PM   --   Depression No   Anxiety Yes     :       9/9/2024     4:27 PM   BAR RBS ROS -    Female only Loss of menstrual cycles    Irregular menstrual cycles   Stress urinary incontinence Yes       LABS/IMAGING/MEDICAL RECORDS REVIEW: [facility]      PHYSICAL EXAMINATION:  Pulse 65   Ht 5' 4.25\" (1.632 " m)   Wt 163 lb (73.9 kg)   LMP 05/15/2023 (Approximate)   SpO2 97%   BMI 27.76 kg/m     GENERAL: Alert and oriented x3. NAD  HEART: No murmurs, rubs or gallops, Regular rate and rhythm  LUNGS: Breathing unlabored. Lung sounds clear to auscultation bilaterally  ABDOMEN: No hernias, Incisions well healed, soft and nontender  EXTREMITIES: No LE edema bilaterally  SKIN: Warm and dry. No intertriginous irritation or rash       ASSESSMENT AND PLAN:      1. 9 years status laparoscopic gastric bypass    We reviewed the important post op bariatric recommendations:  eating 3 meals daily  eating protein first, getting >60gm protein daily  eating slowly, chewing food well  avoiding/limiting calorie containing beverages  avoiding fluids 30 minutes before, during, and after meals  limiting restaurant or cafeteria eating to twice a week or less  Pt reminded to avoid marginal ulcers she should avoid tobacco at all, alcohol in excess, caffeine, and NSAIDS (unless indicated for cardioprotection or othewise and opposed by a PPI).  Pt encouraged to establish and maintain a consistent physical activity routine, 6-8 hours of restorative sleep each night and optimal stress management.  Pt counseled on the importance of life long vitamin supplementation and life long follow up.     2. Morbid Obesity resolved current BMI: Body mass index is 27.76 kg/m .     Congratulated patient on overall weight loss and maintenance. Discussed adding a low dose phentermine. Blood pressure is good. Denies any CAD or out of control anxiety. EKG 5/2023 WNL.    Patient has agreed to have their blood pressure and pulse checked 7-10 days after starting and will let clinic know if blood pressure goes above 140/90 or pulse greater than 100 bpm.    Patient also has BP cuff at home and will check weekly. If elevated will let clinic know. Can send over an additional refill after review    3. Post surgical malabsorption:   Labs ordered per protocol.   Will meet  with dietitian to review post-op vitamins and dietary recommendations  4. Return to clinic in 4 months for a medication check.      Sincerely,    Juliana Miller PA-C    45 minutes spent on the date of the encounter doing chart review, review of test results, patient visit and documentation

## 2024-09-10 NOTE — PATIENT INSTRUCTIONS
Nice to talk with you today.  Below is the plan discussed.-  . Juliana Miller PA-C    Plan:  Labs ordered. Call [phone] to schedule.  Continue your bariatric vitamins   Start phentermine (information below) - get blood pressure and pulse checked about 7-10 days after starting      FOLLOW-UP:  4 months for medication check        Bariatric Post Op Guidelines  General:  Follow up annually lifelong.   Obesity is a chronic disease.  Weight gain can be expected. The goal of follow-up visits is to ensure adequate vitamin and protein absorption, evaluate food intake behavior, review exercise/activity level, and assist with weight regain.    To avoid marginal ulcers avoid all forms of tobacco, alcohol in excess, caffeine, and NSAIDS     Exercise is key for weight loss and weight maintenance. Aim for 30-60 minutes of physical activity most days.  Include cardiovascular and strength training.    Continue lifelong vitamins supplementation and annual lab follow up.  All patients should supplement with the following bariatric postoperative vitamins:  2 Complete multivitamins with minerals (at different times than calcium)  Vitamin D 5000 Int Units/125 mg daily   Calcium 600 mg twice daily or 500 mg three times daily   Vitamin B12: 500 mcg sl daily or 1000 mcg Inj monthly  B complex daily or Thiamine 100 mg weekly  1 Iron/Vit C. Daily for females who menstruate and/or as directed    Relay GI symptoms which can be a sign of complications. Inability to tolerate solid food (chicken, steak, fish) should by need to be evaluated.    There is a 10% increase of Alcohol Use Disorder in patients with bariatric surgery.   Most often occurring around 2 years post op.  Call if you feel alcohol is interfering in your daily life.  We can help.     Nutritional:  Eat 3 meals per day  (No snacks between meals.)  Do not skip meals.  This can cause overeating at the next meal and will prevent adequate protein and nutritional intake.    Aim for  60-80 grams of protein per day.  Always eat your protein first. This assists with optimal nutrition and helps you stay full longer.    Eat your protein first, and then follow with fiber.    Add fiber by including fruits, vegetables, whole grains, and beans.     Portions should be 1 cup per meal.   Continue to use saucer/salad plates, infant/toddler silverware to keep portion sizes small and take small bites.  Make each meal last 20-30 minutes. Always stop eating when satisfied.    Aim for 64 oz. of calorie-free fluids daily.    Avoid drinking 30 min before, during, and 30 min after meal    Avoid high sugar and high fat foods to prevent high calorie intake.   Check nutrition labels for less than 10 grams of sugar and less than 10 grams of fat per serving.       .  Here is some information about the medication we discussed.  Please have your blood pressure and pulse checked 7-10 days after starting and let clinic know if your blood pressure goes above 140/90 or pulse greater than 100 bpm!        MEDICATION STARTED AT THIS APPOINTMENT    We are starting Phentermine. Take one tablet in the morning. Contact the nurse via [facility] or call [phone] if you have any questions or concerns. (Do not stop taking it if you don't think it's working. For some people it works without them knowing it.)    Phentermine is being prescribed because you identified hunger as one of the main causes for your extra weight.      Our patients on Phentermine find that they:    >feel less hunger    >find it easier to push the plate away   >have an easier time eating less    For some of our patients, these feelings are very real and immediate. For other patients, the feelings are less obvious. They don't feel much of a change but find they've lost weight. Like all weight loss medications, Phentermine  works best when you help it work. This means:  1. Having less tempting high calorie (fattening) food around the house or office. (For people with  strong cravings this is very important.)   2. Staying away from situations or people that may trigger your cravings .   3. Eating out only one time or less each week.  4. Eating your meals at a table with the TV or computer off.    Side-effects. Phentermine is generally well tolerated. The main side-effects we see are feelings of racing pulse or rapid heart beat. Some people can get an elevated blood pressure. Because of this we may have you come back within a week or so of starting the medication for a blood pressure check.         In order to get refills of this or any medication we prescribe you must be seen in the medical weight mgmt clinic every 2-3 months.

## 2024-09-11 NOTE — PROGRESS NOTES
"Domitila is a 54 year old who is being evaluated via a billable video visit.      The patient has been notified of following:     \"This video visit will be conducted via a call between you and your physician/provider. We have found that certain health care needs can be provided without the need for an in-person physical exam.  This service lets us provide the care you need with a video conversation.  If a prescription is necessary we can send it directly to your pharmacy.  If lab work is needed we can place an order for that and you can then stop by our lab to have the test done at a later time.    Video visits are billed at different rates depending on your insurance coverage.  Please reach out to your insurance provider with any questions.    If during the course of the call the physician/provider feels a video visit is not appropriate, you will not be charged for this service.\"    Patient has given verbal consent for Video visit? Yes    How would you like to obtain your AVS? MyChart    If the video visit is dropped, the invitation should be resent by: Text to cell phone: [phone]    Will anyone else be joining your video visit? No    I    Video-Visit Details    Type of service:  Video Visit    Originating Location (pt. Location): Home    Distant Location (provider location):   Off-Site - Provider Home Office    Platform used for Video Visit: SUNDAYTOZ    Video Start Time: 3:09    Video End Time: 3:50     NUTRITION POST OP APPOINTMENT  DATE OF VISIT: September 11, 2024    Domitila Luo  1970  female  [id number]  54 year old     ASSESSMENT:    REASON FOR VISIT:  Domitila is a 54 year old year old female presents today for ~9 year PO nutrition follow-up appointment. Patient is accompanied by self.    DIAGNOSIS:  Status post gastric bypass surgery.  Obesity Overweight BMI 25-29.9     ANTHROPOMETRICS:  Initial Weight: 210 lb   Height: 64.25\"   Current Weight: 161.5 lb per pt  BMI: 27.51  kg/(m^2).    VITAMINS " AND MINERALS:  1 Multivitamin with Minerals- [employer] Silver 50 plus (does not meet guidelines for copper)-AM  600 mg Calcium With Vitamin D- [place]- AM  2000 International units Vitamin D  Vitamin  B-12 - advised to stop based on labs       Not required:  500 mg Vitamin C  400 mg Magnesium-for hot flashes   99 mg Potassium- started on own for joint pain  2400 mg fish oil     Got a periods ~ 25 days ago (advised follow up with PCP due to periods had stopped for > 1 year)      NUTRITION HISTORY:  Breakfast: 1 tortilla, 1 egg, cheese  Lunch: leftover from dinner or salad or 1/2 tuna sandwich with lettuce/ tomato  Supper: grilled or air fried meat or fish, roasted veggies, sometimes rice  Snacks: Greek yogurt or cheese or 2/3 banana or protein bar or Belvita crackers-1X per day  Fluids consumed: 11-oz water, 1 cup coffee or tea, protein drink, ETOH- no  Consuming liquid calories: Yes  Protein intake: 60-70 grams/day  Tolerate regular texture food: Yes  Any foods not tolerated details: Yes  If any food not tolerated: pasta, fried foods  Portion size: 1 cup or more  Take 20-30 minutes to consume each meal: No   Eat protein foods first: Yes  Fluids and meals separate by at least 30 minutes: Yes  Chew foods thoroughly: Yes  Tolerating diet: Yes  Drinking high protein supplements: Yes  Consuming snacks per day: 1X per day  Emotional eating: occasional bored some days  Additional Information: Last RD visit was 7/2016. Patient did not know she was supposed to follow up with surgical weight loss clinic annually. Wants to stay on track with weight loss. Lowest post-op weight was 158 lb/ Highest post-op weight was 170 lb. Patient is in the process of moving so busy unpacking. Had to go back on blood pressure mediation.      PHYSICAL ACTIVITY:  Type: bikes in doors or out doors  Frequency (days per week): 3-5  Duration (min): 60   100 miles per week    DIAGNOSIS:  Previous Nutrition Diagnosis: Altered gastrointestinal  function related to alteration in gastrointestinal structure as evidenced by history of gastric bypass surgery.- no change    Previous goals:  Remote-2016    Current Nutrition Diagnosis: Altered gastrointestinal function related to alteration in gastrointestinal structure as evidenced by history of gastric bypass surgery.    INTERVENTION:   Nutrition Prescription: Eat 3 meals a day at regular intervals. Consume 60-90 grams of protein daily. Follow post-surgical vitamin and mineral protocol.  Assessed learning needs and learning preferences. Reviewed and will send: Supplements after Weight -Loss Surgery, Keeping Up Your diet After Weight Loss surgery    GOALS:  Increase multivitamin/ mineral to 2 tablets per day (when done with current product switch to a multivitamin/ mineral that meets clinic guidelines)  Increase calcium to 2 X per day (take at least 2 hours away from multivitamin/ mineral for best absorption)  Start: 100 Thiamin 1X per week or 12 mg per day  Drink at least 48-64 oz water/day (2.5 of 22 oz bottle)  Replace snacking with a protein drink      Implementation: Discussed progress toward previous goals; reinforced importance of following bariatric lifestyle changes.    NUTRITION MONITORING AND EVALUATION:  Anticipated compliance: fair-good  Verbalized fair-good understanding.    Follow up: Patient to follow up in 3 months.    TIME SPENT WITH PATIENT:  41 minutes  Philip Salgado RD, LD  [facility]

## 2024-09-16 ENCOUNTER — VIRTUAL VISIT (OUTPATIENT)
Dept: SURGERY | Facility: CLINIC | Age: 54
End: 2024-09-16
Payer: COMMERCIAL

## 2024-09-16 VITALS — WEIGHT: 161.5 LBS | BODY MASS INDEX: 27.51 KG/M2

## 2024-09-16 DIAGNOSIS — Z98.84 BARIATRIC SURGERY STATUS: Primary | ICD-10-CM

## 2024-09-16 DIAGNOSIS — E66.3 OVERWEIGHT WITH BODY MASS INDEX (BMI) OF 27 TO 27.9 IN ADULT: ICD-10-CM

## 2024-09-16 DIAGNOSIS — K91.2 POSTSURGICAL MALABSORPTION: ICD-10-CM

## 2024-09-16 PROCEDURE — 97803 MED NUTRITION INDIV SUBSEQ: CPT | Mod: 95

## 2024-09-16 NOTE — PATIENT INSTRUCTIONS
Mahendra Ramesh-  Welcome to the [facility], [place]! It was great to visit with you and learn about your progress. Below are the goals we discussed.  GOALS:  Increase multivitamin/ mineral to 2 tablets per day (when done with current product switch to a multivitamin/ mineral that meets clinic guidelines)  Increase calcium to 2 X per day (take at least 2 hours away from multivitamin/ mineral for best absorption)  Start: 100 Thiamin 1X per week or 12 mg per day  Drink at least 48-64 oz water/day (2.5 of 22 oz bottle)  Replace snacking with a protein drink    *When done with current multivitamin mineral one option is to switch to:  1 tablet Bariatric Pal ONE per day with 18 mg iron (available on Amazon)- ok to stop separate vitamin D and Thiamin. Do not restart separate vitamin B-12      Nutrition Educational Materials:  Supplements after Sleeve Gastrectomy, Gastric Bypass or Single Anastomosis Duodenal Switch Surgery    Keeping Up Your Diet after Weight-Loss Surgery      Please call [phone] to schedule your next  visit with a Dietitian in 3 months.   Thanks!  Philip Salgado RD, LD  [facility], [place]

## 2024-10-20 DIAGNOSIS — E66.3 OVERWEIGHT WITH BODY MASS INDEX (BMI) OF 27 TO 27.9 IN ADULT: ICD-10-CM

## 2024-10-21 RX ORDER — PHENTERMINE HYDROCHLORIDE 15 MG/1
15 CAPSULE ORAL EVERY MORNING
Qty: 60 CAPSULE | OUTPATIENT
Start: 2024-10-21

## 2024-10-21 NOTE — TELEPHONE ENCOUNTER
Pt provided 2 months supply 9/10/24.Too early.  Will refuse per clinic protocol.  Jordyn Chin, MS, RD, RN

## 2024-10-30 ENCOUNTER — TELEPHONE (OUTPATIENT)
Dept: SURGERY | Facility: CLINIC | Age: 54
End: 2024-10-30
Payer: COMMERCIAL

## 2024-10-30 NOTE — TELEPHONE ENCOUNTER
Called to inform PT that her visit with addis parikh on 2/17 will need to be switched to virtual. No answer left VM and call back number

## 2024-11-01 ENCOUNTER — MYC REFILL (OUTPATIENT)
Dept: SURGERY | Facility: CLINIC | Age: 54
End: 2024-11-01
Payer: COMMERCIAL

## 2024-11-01 VITALS — HEART RATE: 72 BPM

## 2024-11-01 DIAGNOSIS — E66.3 OVERWEIGHT WITH BODY MASS INDEX (BMI) OF 27 TO 27.9 IN ADULT: ICD-10-CM

## 2024-11-01 RX ORDER — PHENTERMINE HYDROCHLORIDE 15 MG/1
15 CAPSULE ORAL EVERY MORNING
Qty: 90 CAPSULE | Refills: 0 | Status: SHIPPED | OUTPATIENT
Start: 2024-11-01

## 2024-11-01 NOTE — TELEPHONE ENCOUNTER
Pt last seen 9/10/24 and noted:  Patient has agreed to have their blood pressure and pulse checked 7-10 days after starting and will let clinic know if blood pressure goes above 140/90 or pulse greater than 100 bpm.    Patient also has BP cuff at home and will check weekly. If elevated will let clinic know. Can send over an additional refill after review    Is saying would like another 90 days to get down another 10-15# (given 60 9/10)  Has next 2/17/24.  Plz advise - schuyler Chin, MS, RD, RN

## 2024-11-23 DIAGNOSIS — I10 BENIGN ESSENTIAL HYPERTENSION: ICD-10-CM

## 2024-11-25 ENCOUNTER — VIRTUAL VISIT (OUTPATIENT)
Dept: FAMILY MEDICINE | Facility: CLINIC | Age: 54
End: 2024-11-25
Payer: COMMERCIAL

## 2024-11-25 DIAGNOSIS — I10 BENIGN ESSENTIAL HYPERTENSION: Primary | ICD-10-CM

## 2024-11-25 DIAGNOSIS — R25.1 TREMOR: ICD-10-CM

## 2024-11-25 DIAGNOSIS — T70.20XA EFFECTS OF HIGH ALTITUDE, INITIAL ENCOUNTER: ICD-10-CM

## 2024-11-25 DIAGNOSIS — F41.1 GAD (GENERALIZED ANXIETY DISORDER): ICD-10-CM

## 2024-11-25 DIAGNOSIS — G43.709 CHRONIC MIGRAINE WITHOUT AURA WITHOUT STATUS MIGRAINOSUS, NOT INTRACTABLE: ICD-10-CM

## 2024-11-25 PROCEDURE — 99214 OFFICE O/P EST MOD 30 MIN: CPT | Mod: 95 | Performed by: FAMILY MEDICINE

## 2024-11-25 PROCEDURE — 96127 BRIEF EMOTIONAL/BEHAV ASSMT: CPT | Mod: 95 | Performed by: FAMILY MEDICINE

## 2024-11-25 PROCEDURE — G2211 COMPLEX E/M VISIT ADD ON: HCPCS | Mod: 95 | Performed by: FAMILY MEDICINE

## 2024-11-25 RX ORDER — LOSARTAN POTASSIUM AND HYDROCHLOROTHIAZIDE 25; 100 MG/1; MG/1
1 TABLET ORAL EVERY MORNING
Qty: 90 TABLET | Refills: 1 | OUTPATIENT
Start: 2024-11-25

## 2024-11-25 RX ORDER — PROPRANOLOL HCL 20 MG
20 TABLET ORAL
Qty: 90 TABLET | Refills: 3 | Status: SHIPPED | OUTPATIENT
Start: 2024-11-25

## 2024-11-25 RX ORDER — LOSARTAN POTASSIUM AND HYDROCHLOROTHIAZIDE 25; 100 MG/1; MG/1
1 TABLET ORAL EVERY MORNING
Qty: 90 TABLET | Refills: 1 | Status: SHIPPED | OUTPATIENT
Start: 2024-11-25

## 2024-11-25 RX ORDER — FLUOXETINE 40 MG/1
40 CAPSULE ORAL DAILY
Qty: 90 CAPSULE | Refills: 1 | Status: SHIPPED | OUTPATIENT
Start: 2024-11-25

## 2024-11-25 RX ORDER — ACETAZOLAMIDE 125 MG/1
125 TABLET ORAL DAILY
Qty: 30 TABLET | Refills: 0 | Status: SHIPPED | OUTPATIENT
Start: 2024-11-25

## 2024-11-25 ASSESSMENT — ANXIETY QUESTIONNAIRES
IF YOU CHECKED OFF ANY PROBLEMS ON THIS QUESTIONNAIRE, HOW DIFFICULT HAVE THESE PROBLEMS MADE IT FOR YOU TO DO YOUR WORK, TAKE CARE OF THINGS AT HOME, OR GET ALONG WITH OTHER PEOPLE: NOT DIFFICULT AT ALL
4. TROUBLE RELAXING: SEVERAL DAYS
3. WORRYING TOO MUCH ABOUT DIFFERENT THINGS: SEVERAL DAYS
2. NOT BEING ABLE TO STOP OR CONTROL WORRYING: SEVERAL DAYS
6. BECOMING EASILY ANNOYED OR IRRITABLE: NOT AT ALL
GAD7 TOTAL SCORE: 8
7. FEELING AFRAID AS IF SOMETHING AWFUL MIGHT HAPPEN: SEVERAL DAYS
7. FEELING AFRAID AS IF SOMETHING AWFUL MIGHT HAPPEN: SEVERAL DAYS
GAD7 TOTAL SCORE: 8
5. BEING SO RESTLESS THAT IT IS HARD TO SIT STILL: SEVERAL DAYS
8. IF YOU CHECKED OFF ANY PROBLEMS, HOW DIFFICULT HAVE THESE MADE IT FOR YOU TO DO YOUR WORK, TAKE CARE OF THINGS AT HOME, OR GET ALONG WITH OTHER PEOPLE?: NOT DIFFICULT AT ALL
GAD7 TOTAL SCORE: 8
1. FEELING NERVOUS, ANXIOUS, OR ON EDGE: NEARLY EVERY DAY

## 2024-11-25 NOTE — PROGRESS NOTES
"  If patient has telephone visit, have they been educated on video visit as preferred visit method and offered to change to video visit? N/A        Instructions Relayed to Patient by Virtual Roomer:     Patient is active on [employer]:   Relayed following to patient: \"It looks like you are active on [employer], are you able to join the visit this way? If not, do you need us to send you a link now or would you like your provider to send a link via text or email when they are ready to initiate the visit?\"      Patient Confirmed they will join visit via: oneDrum  Reminded patient to ensure they were logged on to virtual visit by arrival time listed.   Asked if patient has flexibility to initiate visit sooner than arrival time: patient is unable to initiate visit earlier than arrival time     If pediatric virtual visit, ensured pediatric patient along with parent/guardian will be present for video visit.     Patient offered the website [email or web address] and/or phone number to [employer] Help line: [phone]      Domitila is a 54 year old who is being evaluated via a billable video visit.    How would you like to obtain your AVS? oneDrum  If the video visit is dropped, the invitation should be resent by: Text to cell phone: [phone]  Will anyone else be joining your video visit? No      Assessment & Plan     Benign essential hypertension  Reviewed normal home blood pressure numbers, continue with current dose of Hyzaar, low-salt diet, regular exercises, recheck in 6 months along with fasting labs at the time of physical or sooner if needed.  - losartan-hydrochlorothiazide (HYZAAR) 100-25 MG tablet; Take 1 tablet by mouth every morning.    TERRANCE (generalized anxiety disorder)  Stable, continue with current medications, recheck in 6 months at the time of physical or sooner if needed.  - EMOTIONAL / BEHAVIORAL ASSESSMENT  - propranolol (INDERAL) 20 MG tablet; Take 1 tablet (20 mg) by mouth nightly as needed " "(tremor, anxiety,).  - FLUoxetine (PROZAC) 40 MG capsule; Take 1 capsule (40 mg) by mouth daily. Profile Rx    Chronic migraine without aura without status migrainosus, not intractable  Stable, continue to avoid potential triggers for migraine, continue with Inderal for migraine prophylaxis and Imitrex as needed  Recheck in 6 months or sooner if needed  - propranolol (INDERAL) 20 MG tablet; Take 1 tablet (20 mg) by mouth nightly as needed (tremor, anxiety,).    Tremor  Stable, continue current dose of propranolol  - propranolol (INDERAL) 20 MG tablet; Take 1 tablet (20 mg) by mouth nightly as needed (tremor, anxiety,).    Effects of high altitude, initial encounter  Patient is planning for a trip back home in [facility], prescription given for acetazolamide to use for her trip to the high altitude [facility] there  Patient reported using it 3 years ago, previous prescription was given from PCP at [facility]  Dosing and potential medication side effects discussed.  Patient verbalised understanding and is agreeable to the plan.    - acetaZOLAMIDE (DIAMOX) 125 MG tablet; Take 1 tablet (125 mg) by mouth daily.          BMI  Estimated body mass index is 27.51 kg/m  as calculated from the following:    Height as of 9/10/24: 1.632 m (5' 4.25\").    Weight as of 9/16/24: 73.3 kg (161 lb 8 oz).   Weight management plan: Patient referred to endocrine and/or weight management specialty      Chart documentation done in part with Dragon Voice recognition Software. Although reviewed after completion, some word and grammatical error may remain.    See Patient Instructions    Dayanara Ramesh is a 54 year old, presenting for the following health issues:  Patient is here for a video visit instead of in person visit due to the current COVID-19 pandemic.    Hypertension and Depression (Depression and Anxiety Recheck )      11/25/2024     7:06 AM   Additional Questions   Roomed by Fátima DUGAN   Accompanied by Self     History of " Present Illness       Mental Health Follow-up:  Patient presents to follow-up on Depression & Anxiety.Patient's depression since last visit has been:  Better  The patient is not having other symptoms associated with depression.  Patient's anxiety since last visit has been:  Better  The patient is not having other symptoms associated with anxiety.  Any significant life events: No  Patient is not feeling anxious or having panic attacks.  Patient has no concerns about alcohol or drug use.    Hypertension: She presents for follow up of hypertension.  She does check blood pressure  regularly outside of the clinic. Outpatient blood pressures have not been over 140/90. She follows a low salt diet.     She eats 4 or more servings of fruits and vegetables daily.She consumes 0 sweetened beverage(s) daily.She exercises with enough effort to increase her heart rate 30 to 60 minutes per day.  She exercises with enough effort to increase her heart rate 7 days per week.   She is taking medications regularly.       Hypertension Follow-up    Do you check your blood pressure regularly outside of the clinic? Yes   Are you following a low salt diet? Yes  Are your blood pressures ever more than 140 on the top number (systolic) OR more   than 90 on the bottom number (diastolic), for example 140/90? No    Depression and Anxiety   How are you doing with your depression since your last visit? Improved   How are you doing with your anxiety since your last visit?  No change  Are you having other symptoms that might be associated with depression or anxiety? Yes:  See last PHQ-9 and TERRANCE-7  Have you had a significant life event? No   Do you have any concerns with your use of alcohol or other drugs? No    Social History     Tobacco Use    Smoking status: Never    Smokeless tobacco: Never   Vaping Use    Vaping status: Never Used   Substance Use Topics    Alcohol use: No    Drug use: No          No data to display                  2/24/2014      9:14 AM 8/17/2015     1:57 PM 11/25/2024     7:05 AM   TERRANCE-7 SCORE   Total Score 3 3    Total Score   8 (mild anxiety)   Total Score   8        Patient-reported          No data to display                  11/25/2024     7:05 AM   TERRANCE-7    1. Feeling nervous, anxious, or on edge 3    2. Not being able to stop or control worrying 1    3. Worrying too much about different things 1    4. Trouble relaxing 1    5. Being so restless that it is hard to sit still 1    6. Becoming easily annoyed or irritable 0    7. Feeling afraid, as if something awful might happen 1    TERRANCE-7 Total Score 8    If you checked any problems, how difficult have they made it for you to do your work, take care of things at home, or get along with other people? Not difficult at all        Patient-reported       Suicide Assessment Five-step Evaluation and Treatment (SAFE-T)    Migraine   Since your last clinic visit, how have your headaches changed?  Improved  How often are you getting headaches or migraines?  Has not had a headache in a while  Are you able to do normal daily activities when you have a migraine? Yes  Are you taking rescue/relief medications? (Select all that apply) sumatriptan (Imitrex)  How helpful is your rescue/relief medication?  I get total relief  Are you taking any medications to prevent migraines? (Select all that apply)  Inderal  In the past 4 weeks, how often have you gone to urgent care or the emergency room because of your headaches?  0  High-altitude illness-patient reports getting a prescription for acetazolamide from her previous PCP had an epidurals get 3 years ago when she visited home back in [facility] where she made trips to the high [place] [facility]  Patient is requesting prescription today for her upcoming trip on December 19        Review of Systems  CONSTITUTIONAL: NEGATIVE for fever, chills, change in weight  RESP: NEGATIVE for significant cough or SOB  CV: NEGATIVE for chest pain, palpitations or  peripheral edema  CV: Hx HTN  MUSCULOSKELETAL: NEGATIVE for significant arthralgias or myalgia  NEURO: NEGATIVE for weakness, dizziness or paresthesias and Hx headaches-migraine  ENDOCRINE: NEGATIVE for temperature intolerance, skin/hair changes  HEME/ALLERGY/IMMUNE: NEGATIVE for bleeding problems  PSYCHIATRIC: HX anxiety and HX depression      Objective           Vitals:  No vitals were obtained today due to virtual visit.    Physical Exam   GENERAL: alert and no distress  EYES: Eyes grossly normal to inspection  RESP: No audible wheeze, cough, or visible cyanosis.    NEURO: Cranial nerves grossly intact.  Mentation and speech appropriate for age.  PSYCH: Appropriate affect, tone, and pace of words          Video-Visit Details    Type of service:  Video Visit   Originating Location (pt. Location): Home    Distant Location (provider location):  Off-site  Platform used for Video Visit: [facility]  Signed Electronically by: Marcos Ko MD

## 2024-12-29 DIAGNOSIS — T70.20XA EFFECTS OF HIGH ALTITUDE, INITIAL ENCOUNTER: ICD-10-CM

## 2025-01-01 RX ORDER — ACETAZOLAMIDE 125 MG/1
TABLET ORAL
Qty: 30 TABLET | Refills: 0 | Status: SHIPPED | OUTPATIENT
Start: 2025-01-01

## 2025-01-20 ENCOUNTER — VIRTUAL VISIT (OUTPATIENT)
Dept: SURGERY | Facility: CLINIC | Age: 55
End: 2025-01-20
Payer: COMMERCIAL

## 2025-01-20 VITALS — BODY MASS INDEX: 27.08 KG/M2 | WEIGHT: 159 LBS

## 2025-01-20 DIAGNOSIS — Z98.84 BARIATRIC SURGERY STATUS: Primary | ICD-10-CM

## 2025-01-20 DIAGNOSIS — E66.3 OVERWEIGHT WITH BODY MASS INDEX (BMI) OF 27 TO 27.9 IN ADULT: ICD-10-CM

## 2025-01-20 DIAGNOSIS — K91.2 POSTSURGICAL MALABSORPTION: ICD-10-CM

## 2025-01-20 PROCEDURE — 97803 MED NUTRITION INDIV SUBSEQ: CPT | Mod: 95 | Performed by: DIETITIAN, REGISTERED

## 2025-01-20 NOTE — PATIENT INSTRUCTIONS
Mahendra Ramesh-   It was great to visit with you and learn about your progress! Below are the goals we discussed.  Increase multivitamin/ mineral to 2 tablets per day (when done with current product switch to a multivitamin/ mineral that meets clinic guidelines)   Nutrition Educational Materials:  Preventing Dumping Syndrome after Weight-Loss Surgery        Please call [phone] to schedule your next visit with a Dietitian in 3 months.    Please reach out to your care team through [employer] with any questions or concerns.    Thanks!  Philip Salgado RD, BONY  [facility]

## 2025-01-20 NOTE — PROGRESS NOTES
"Domitila is a 55 year old who is being evaluated via a billable video visit.      The patient has been notified of following:     \"This video visit will be conducted via a call between you and your physician/provider. We have found that certain health care needs can be provided without the need for an in-person physical exam.  This service lets us provide the care you need with a video conversation.  If a prescription is necessary we can send it directly to your pharmacy.  If lab work is needed we can place an order for that and you can then stop by our lab to have the test done at a later time.    Video visits are billed at different rates depending on your insurance coverage.  Please reach out to your insurance provider with any questions.    If during the course of the call the physician/provider feels a video visit is not appropriate, you will not be charged for this service.\"    Patient has given verbal consent for Video visit? Yes    How would you like to obtain your AVS? MyChart    If the video visit is dropped, the invitation should be resent by: Text to cell phone: [phone]    Will anyone else be joining your video visit? No    I    Video-Visit Details    Type of service:  Video Visit    Originating Location (pt. Location): Home    Distant Location (provider location):   Off-Site - Provider Home Office    Platform used for Video Visit: [employer]    Video Start Time: 2:30    Video End Time:        NUTRITION POST OP APPOINTMENT  DATE OF VISIT: January 20, 2025    Domitila Luo  1970  female  [id number]  55 year old     ASSESSMENT:    REASON FOR VISIT:  Domitila is a 55 year old year old female presents today for ~9 year PO nutrition follow-up appointment. Patient is accompanied by self.    DIAGNOSIS:  Status post gastric bypass surgery.   Overweight BMI 25-29.9     ANTHROPOMETRICS:  Initial Weight: 210 lb   Height: 64.25\"   Current Weight: 159 lb per pt     BMI: 27.08  kg/(m^2).    VITAMINS AND " MINERALS:  1 Multivitamin with Minerals-Centrum for Women 50 plus- 8 mg iron, 0.5 mg copper- AM  600 mg Calcium With Vitamin D -AM  2000 International units Vitamin D-PM  Vitamin B-12 - none advised to stop by PCP based on lab results    Not required:  500 mg Vitamin C-AM  400 mg Magnesium-AM  99 mg Potassium-AM     Not getting periods    MEDICATIONS FOR WEIGHT LOSS:  Phentermine    NUTRITION HISTORY:  Breakfast: yogurt, apple or apple, 1/2 ham/cheese quesadilla (corn tortilla)  Lunch: ~2-3 oz fish or meat stew-biggest meal  Supper: leftover from lunch or air fried chicken, veggies,   Snacks: rare  Fluids consumed: 1.5 L water, 1 cup coffee or tea  Consuming liquid calories: Yes  Protein intake: 40-50 grams/day  Tolerate regular texture food: Yes  Any foods not tolerated details: Yes  If any food not tolerated: pasta  Portion size: 1 cup  Take 20-30 minutes to consume each meal: No   Eat protein foods first: Yes  Fluids and meals separate by at least 30 minutes: No  Chew foods thoroughly: Yes  Tolerating diet: Yes  Drinking high protein supplements: No  Consuming snacks per day: rare  Additional Information: Having loose/ watery stools and 3-4 times/day since last Fall. Rarely eats anything sweet or fried foods. Tires to limit intake of carbohydrates at meals. Wants to see GI specialists (pt to see referral). Limits intake of gluten, but still having los stools. Dose not drink any milk, but tolerates cheese. Pt feels eating smaller portions with use of phentermine.         PHYSICAL ACTIVITY:  Type: bike  Frequency (days per week): 4  Duration (min): 60    DIAGNOSIS:  Previous Nutrition Diagnosis: Altered gastrointestinal function related to alteration in gastrointestinal structure as evidenced by history of gastric bypass surgery.- no change    Previous goals:  Increase multivitamin/ mineral to 2 tablets per day (when done with current product switch to a multivitamin/ mineral that meets clinic guidelines) - not  met  Increase calcium to 2 X per day (take at least 2 hours away from multivitamin/ mineral for best absorption)-not met  Start: 100 Thiamin 1X per week or 12 mg per day-not met  Drink at least 48-64 oz water/day (2.5 of 22 oz bottle)-met  Replace snacking with a protein drink-no    Current Nutrition Diagnosis: Altered gastrointestinal function related to alteration in gastrointestinal structure as evidenced by history of gastric bypass surgery.    INTERVENTION:   Nutrition Prescription: Eat 3 meals a day at regular intervals. Consume 60-90 grams of protein daily. Follow post-surgical vitamin and mineral protocol.  Assessed learning needs and learning preferences.    GOALS:  Increase multivitamin/ mineral to 2 tablets per day until gone (when done with current product switch to a Bariatric specialty vitamins/ mineral such as Bariatric Pal Multivitamin ONE per day with 18 mg iron)  Increase calcium to 2 X per day (take at least 2 hours away from multivitamin/ mineral for best absorption)  Ok to stop any supplements that are not recommended by your providers  Switch to decaffeinated beverages  Aim to get in 60-90 grams of protein/day      Implementation: Discussed progress toward previous goals; reinforced importance of following bariatric lifestyle changes. Discussed and will send: Preventing Dumping Syndrome after Weight Loss Surgery    NUTRITION MONITORING AND EVALUATION:  Anticipated compliance: fair-good  Verbalized fair understanding.3    Follow up: Patient to follow up in 3 months.    TIME SPENT WITH PATIENT:  29 minutes  Philip Salgado RD, BONY  [facility]

## 2025-02-06 NOTE — PROGRESS NOTES
"Domitila is a 55 year old who is being evaluated via a billable video visit.      The patient has been notified of following:     \"This video visit will be conducted via a call between you and your physician/provider. We have found that certain health care needs can be provided without the need for an in-person physical exam.  This service lets us provide the care you need with a video conversation.  If a prescription is necessary we can send it directly to your pharmacy.  If lab work is needed we can place an order for that and you can then stop by our lab to have the test done at a later time.    Video visits are billed at different rates depending on your insurance coverage.  Please reach out to your insurance provider with any questions.    If during the course of the call the physician/provider feels a video visit is not appropriate, you will not be charged for this service.\"    Patient has given verbal consent for Video visit? Yes    How would you like to obtain your AVS? MyChart    If the video visit is dropped, the invitation should be resent by: My Chart    Will anyone else be joining your video visit? No    I    Video-Visit Details    Type of service:  Video Visit    Originating Location (pt. Location): Home in [place]     Distant Location (provider location):   [facility]    Platform used for Video Visit: [employer]    Video Start Time: 9:22 AM    Video End Time:9:54 AM      2025      Return Medical Weight Management Note     Domitila Luo  MRN:  [id number]  :  1970    Assessment & Plan   Problem List Items Addressed This Visit       Bariatric surgery status - Primary     Broad labs with annual checks ordered with reports of fatigue and diarrhea.         Relevant Orders    CBC with platelets    Vitamin B12    Vitamin D Screen    Parathyroid Hormone Intact    Iron and Iron Binding Capacity    Ferritin    Overweight     Domitila is having a flair of diarrhea of " unclear etiology. Discussed possible side effect of phentermine but she reports having episodes of this prior and would like to continue with monitoring and hearing GI's recommendations (has apt w/them tomorrow). Reviewed possible bariatric surgery correlation but denies dumping syndrome symptoms nor does she report RUQ abdominal pain/correlation with food intake to suggest gallstones (reviewed those s/s). She states it's more noise/movement and all over abdomen and if anything more on the left side. We will therefore defer to GI for recommendations. Will obtain broad vitamin panel - typical annual vitamins not checked this fall and will include broad panel given reports of diarrhea and fatigue. We reviewed recommendations for muscle conservation including eating three meals daily, good protein intake, and strength training.           Postsurgical malabsorption    Relevant Orders    CBC with platelets    Vitamin B12    Vitamin D Screen    Parathyroid Hormone Intact    Iron and Iron Binding Capacity    Ferritin    Vitamin B1 whole blood    Copper level    Zinc    Selenium    Vitamin A    Comprehensive metabolic panel    TSH with free T4 reflex        PATIENT INSTRUCTIONS:  Pt Instructions:  Continue 15 mg Phentermine daily for now   Typical needed vitamin amounts:  2 Complete multivitamins with minerals (at different times than calcium)  Vitamin D 5000 Int Units/125 mg daily   Calcium 600 mg twice daily or 500 mg three times daily   Vitamin B12: 500 mcg sl daily or 1000 mcg Inj monthly  B complex daily or Thiamine 100 mg weekly  3.  Labs ordered.  Please call [phone] to set up a lab appt.      Goals:  Continue to focus on healthy food choices - prioritizing protein and veggies in your meals. I recommend eating every 4-6 hours to reduce the risk of low blood sugars and try to minimize snacking between meals. Stay well hydrated though the day as well.  Great job with exercising - please continue to invest in  yourself with regular exercise. Continue to strive for a range for 150-300 minutes of exercise for weight maintenance and incorporating strength training twice-three times a week to help preserve muscle!      Follow up:    Call [phone] to schedule next visit in 3-4 months is possible. Be in touch on Mychart for refills/concerns between visits    44 minutes spent on the date of the encounter doing chart review, history and exam, result review, counseling, developing plan of care, documentation, and further activities as noted      INTERVAL HISTORY:  Domitila returns for medical weight management follow up.  Last seen on 9/10/2024 with Juliana Miller PA-C and plan to start phentermine    Patient is taking the following bariatric postoperative vitamins  1/25 dietitian visit with Philip  VITAMINS AND MINERALS:  1 Multivitamin with Minerals-Centrum for Women 50 plus- 8 mg iron, 0.5 mg copper- AM  600 mg Calcium With Vitamin D -AM  2000 International units Vitamin D-PM  Vitamin B-12 - none advised to stop by PCP based on lab results         Today:  She's taking 15 mg daily and states she's pretty stable - will rotate between 155-158 lbs. She does note diarrhea but doesn't think it's related to phentermine. We discussed typically phentermine has a risk for constipation - but does list risk of diarrhea - rate not listed. She has noted since bariatric surgery but states was related to fatty foods/gluten. She states had colonoscopy - normal. Generally has been better and now picking up again since November and now daily. Does have urgency as well - has an apt with GI actually for this Wednesday. Unsure if phentermine related - states doesn't correlate with food intake like with dumping syndrome (states did have that after surgery but was very different than this experience). Reports happens 4-5 times a day. She states she does still have her gallbladder but states pain is not on the right side - more general abdomen and  it's not painful but more noisy and stomach/intestines moving frequently. Described gallbladder signs/symptoms and she states that is not the case - tends to be more on the left side. She also states it very clearly isn't acid reflux related. She had some prior to surgery but improved after surgery and stopped her omeprazole and fine as well.    She reports eating small portions and does feel that the medication helped with that piece. States she's lost 6-8 lbs with the medication and same exercises - reviewed muscle conservation recommendations. She states she's fine w/her current weight.    Discussed stopping in case influencing diarrhea but she would like to hear from GI team as it's more prone to contribute to constipation and she does feel it has been helpful for response so far.     Currently - still using up the 2 tablets multivitamins.  500 mg Calcium once a day/ vitamin C, vitamin D (maybe 1000 international unit(s)) . B12 was stopped by primary d/t high she states prior.  Doing omega 3 vitamin    She states she will stop the current multivitamin and wanting to switch to Bariatric specialty one.     WEIGHT METRICS:  Body mass index is 26.91 kg/m .   Current Weight: 158 lb (71.7 kg)  Last Visits Weight: 163 lb (73.9 kg)  Initial Weight (lbs): 210 lbs  Cumulative weight loss (lbs): 52  Weight Loss Percentage: 24.76%    Wt Readings from Last 10 Encounters:   02/17/25 158 lb (71.7 kg)   01/20/25 159 lb (72.1 kg)   09/16/24 161 lb 8 oz (73.3 kg)   09/10/24 163 lb (73.9 kg)   05/24/24 169 lb 14.4 oz (77.1 kg)   11/08/23 170 lb 4.8 oz (77.2 kg)   05/03/23 164 lb 11.2 oz (74.7 kg)   04/17/23 163 lb 14.4 oz (74.3 kg)   12/19/16 165 lb 6.4 oz (75 kg)   08/18/16 163 lb 4.8 oz (74.1 kg)             LABS:  Hemoglobin A1C   Date Value Ref Range Status   05/21/2024 5.5 0.0 - 5.6 % Final     Comment:     Normal <5.7%   Prediabetes 5.7-6.4%    Diabetes 6.5% or higher     Note: Adopted from ADA consensus guidelines.    11/17/2016 5.3 4.3 - 6.0 % Final     Creatinine   Date Value Ref Range Status   06/12/2024 0.79 0.51 - 0.95 mg/dL Final   11/17/2016 0.69 0.52 - 1.04 mg/dL Final     GFR Estimate   Date Value Ref Range Status   06/12/2024 88 >60 mL/min/1.73m2 Final   11/17/2016 >90  Non  GFR Calc   >60 mL/min/1.7m2 Final     Carbon Dioxide   Date Value Ref Range Status   11/17/2016 27 20 - 32 mmol/L Final     Carbon Dioxide (CO2)   Date Value Ref Range Status   06/12/2024 23 22 - 29 mmol/L Final   04/17/2023 29 20 - 32 mmol/L Final     Chloride   Date Value Ref Range Status   06/12/2024 102 98 - 107 mmol/L Final   04/17/2023 105 94 - 109 mmol/L Final   11/17/2016 106 94 - 109 mmol/L Final     Urea Nitrogen   Date Value Ref Range Status   06/12/2024 15.5 6.0 - 20.0 mg/dL Final   04/17/2023 14 7 - 30 mg/dL Final   11/17/2016 9 7 - 30 mg/dL Final     ALT   Date Value Ref Range Status   12/20/2023 42 0 - 50 U/L Final     Comment:     Reference intervals for this test were updated on 6/12/2023 to more accurately reflect our healthy population. There may be differences in the flagging of prior results with similar values performed with this method. Interpretation of those prior results can be made in the context of the updated reference intervals.     04/13/2016 46 0 - 50 U/L Final     AST   Date Value Ref Range Status   12/20/2023 26 0 - 45 U/L Final     Comment:     Reference intervals for this test were updated on 6/12/2023 to more accurately reflect our healthy population. There may be differences in the flagging of prior results with similar values performed with this method. Interpretation of those prior results can be made in the context of the updated reference intervals.   04/13/2016 23 0 - 45 U/L Final     Vitamin D Deficiency screening   Date Value Ref Range Status   12/19/2016 44 20 - 75 ug/L Final     Comment:     Season, race, dietary intake, and treatment affect the concentration of   25-hydroxy-Vitamin D.  "Values may decrease during winter months and increase   during summer months. Values 20-29 ug/L may indicate Vitamin D insufficiency   and values <20 ug/L may indicate Vitamin D deficiency.   Vitamin D determination is routinely performed by an immunoassay specific for   25 hydroxyvitamin D3.  If an individual is on vitamin D2 (ergocalciferol)   supplementation, please specify 25 OH vitamin D2 and D3 level determination   by   LCMSMS test [id number].       Vitamin D, Total (25-Hydroxy)   Date Value Ref Range Status   05/21/2024 50 20 - 50 ng/mL Final     Comment:     optimum levels   04/17/2023 81 (H) 20 - 75 ug/L Final     TSH   Date Value Ref Range Status   04/17/2023 2.08 0.40 - 4.00 mU/L Final   12/19/2016 1.79 0.40 - 4.00 mU/L Final        BP Readings from Last 6 Encounters:   06/12/24 126/83   05/24/24 (!) 148/106   03/05/24 137/84   02/01/24 (!) 143/86   01/29/24 126/89   12/20/23 120/80       Pulse Readings from Last 6 Encounters:   11/01/24 72   09/10/24 65   06/12/24 72   05/24/24 57   02/01/24 59   01/29/24 69       PE:  Ht 5' 4.25\" (1.632 m)   Wt 158 lb (71.7 kg)   LMP 05/15/2023 (Approximate)   BMI 26.91 kg/m    GENERAL: Healthy, alert and no distress  EYES: Eyes grossly normal to inspection.    RESP: No audible wheeze, cough, or visible cyanosis.  No increased work of breathing.    SKIN: Visible skin clear. No significant rash, abnormal pigmentation or lesions.  NEURO: Mentation and speech appropriate for age.  PSYCH: Mentation appears normal, affect normal/bright, judgement and insight intact, normal speech and appearance well-groomed.      Sincerely,      Kimber Nelson PA-C         "

## 2025-02-17 ENCOUNTER — VIRTUAL VISIT (OUTPATIENT)
Dept: SURGERY | Facility: CLINIC | Age: 55
End: 2025-02-17
Payer: COMMERCIAL

## 2025-02-17 VITALS — BODY MASS INDEX: 26.98 KG/M2 | WEIGHT: 158 LBS | HEIGHT: 64 IN

## 2025-02-17 DIAGNOSIS — K91.2 POSTSURGICAL MALABSORPTION: ICD-10-CM

## 2025-02-17 DIAGNOSIS — Z98.84 BARIATRIC SURGERY STATUS: Primary | ICD-10-CM

## 2025-02-17 DIAGNOSIS — E66.3 OVERWEIGHT: ICD-10-CM

## 2025-02-17 PROCEDURE — 98007 SYNCH AUDIO-VIDEO EST HI 40: CPT | Performed by: PHYSICIAN ASSISTANT

## 2025-02-17 PROCEDURE — G2211 COMPLEX E/M VISIT ADD ON: HCPCS | Performed by: PHYSICIAN ASSISTANT

## 2025-02-17 NOTE — ASSESSMENT & PLAN NOTE
Domitila is having a flair of diarrhea of unclear etiology. Discussed possible side effect of phentermine but she reports having episodes of this prior and would like to continue with monitoring and hearing GI's recommendations (has apt w/them tomorrow). Reviewed possible bariatric surgery correlation but denies dumping syndrome symptoms nor does she report RUQ abdominal pain/correlation with food intake to suggest gallstones (reviewed those s/s). She states it's more noise/movement and all over abdomen and if anything more on the left side. We will therefore defer to GI for recommendations. Will obtain broad vitamin panel - typical annual vitamins not checked this fall and will include broad panel given reports of diarrhea and fatigue. We reviewed recommendations for muscle conservation including eating three meals daily, good protein intake, and strength training.

## 2025-02-17 NOTE — Clinical Note
Can you send Domitila the list of recommend specialty vitamins? She'd like to not have to take so many vitamins. I'll be ordering full vitamin/mineral checks as she is over due for those and additional with her diarrhea. Let me know if any specific requests for checks from your side!  - Kimber Nelson, PA-C

## 2025-02-17 NOTE — PATIENT INSTRUCTIONS
Nice to talk with you today. Below is the plan discussed.-  Kimber Nelson PA-C     Pt Instructions:  Continue 15 mg Phentermine daily for now   Typical needed vitamin amounts:  2 Complete multivitamins with minerals (at different times than calcium)  Vitamin D 5000 Int Units/125 mg daily   Calcium 600 mg twice daily or 500 mg three times daily   Vitamin B12: 500 mcg sl daily or 1000 mcg Inj monthly  B complex daily or Thiamine 100 mg weekly  3.  Labs ordered.  Please call [phone] to set up a lab appt.      Goals:  Continue to focus on healthy food choices - prioritizing protein and veggies in your meals. I recommend eating every 4-6 hours to reduce the risk of low blood sugars and try to minimize snacking between meals. Stay well hydrated though the day as well.  Great job with exercising - please continue to invest in yourself with regular exercise. Continue to strive for a range for 150-300 minutes of exercise for weight maintenance and incorporating strength training twice-three times a week to help preserve muscle!      Follow up:    Call [phone] to schedule next visit in 3-4 months is possible. Be in touch on [employer] for refills/concerns between visits        Bariatric Post Op Guidelines  General:  Follow up annually lifelong.   Obesity is a chronic disease.  Weight gain can be expected. The goal of follow-up visits is to ensure adequate vitamin and protein absorption, evaluate food intake behavior, review exercise/activity level, and assist with weight regain.    To avoid marginal ulcers avoid all forms of tobacco, alcohol in excess, caffeine, and NSAIDS     Exercise is key for weight loss and weight maintenance. Aim for 30-60 minutes of physical activity most days.  Include cardiovascular and strength training.    Continue lifelong vitamins supplementation and annual lab follow up.  All patients should supplement with the following bariatric postoperative vitamins:  2 Complete multivitamins with  minerals (at different times than calcium)  Vitamin D 5000 Int Units/125 mg daily   Calcium 600 mg twice daily or 500 mg three times daily   Vitamin B12: 500 mcg sl daily or 1000 mcg Inj monthly  B complex daily or Thiamine 100 mg weekly  1 Iron/Vit C. Daily for females who menstruate and/or as directed    Relay GI symptoms which can be a sign of complications. Inability to tolerate solid food (chicken, steak, fish) should by need to be evaluated.    There is a 10% increase of Alcohol Use Disorder in patients with bariatric surgery.   Most often occurring around 2 years post op.  Call if you feel alcohol is interfering in your daily life.  We can help.     Nutritional:  Eat 3 meals per day  (No snacks between meals.)  Do not skip meals.  This can cause overeating at the next meal and will prevent adequate protein and nutritional intake.    Aim for 60-80 grams of protein per day.  Always eat your protein first. This assists with optimal nutrition and helps you stay full longer.    Eat your protein first, and then follow with fiber.    Add fiber by including fruits, vegetables, whole grains, and beans.     Portions should be 1 cup per meal.   Continue to use saucer/salad plates, infant/toddler silverware to keep portion sizes small and take small bites.  Make each meal last 20-30 minutes. Always stop eating when satisfied.    Aim for 64 oz. of calorie-free fluids daily.    Avoid drinking 30 min before, during, and 30 min after meal    Avoid high sugar and high fat foods to prevent high calorie intake.   Check nutrition labels for less than 10 grams of sugar and less than 10 grams of fat per serving.

## 2025-02-18 NOTE — PROGRESS NOTES
NEW PATIENT GI CLINIC VISIT    CC/REFERRING MD:  Marcos Ko  REASON FOR CONSULTATION:   Domitila Luo is a 55 year old female who I was asked to see in consultation at the request of Dr. Marcos Ko for   Chief Complaint   Patient presents with    New Patient       ASSESSMENT/PLAN:  55 year old female s/p gastric bypass surgery (2015) with chronic diarrhea. Started in 2023 - workup with PCP including labs and diagnostic colonoscopy with random colon biopsies on 2/1/24 was unremarkable. Symptoms improved temporarily in the Spring/Summer of 2024 but recurred in the Fall. Currently experiencing 3-4 watery stools per day which seem to occur post-prandially. Some associated upper abdominal bloating and nausea, as well as mild lower abdominal cramping and fecal urgency. We discussed possible etiologies including malabsorption, dumping syndrome, medication side effect, biliary pathology, infection (unlikely given time frame), inflammation, IBS-D, vs other. Will proceed with some additional laboratory workup including Celiac serologies, inflammatory markers (CRP, fecal calprotectin), fecal elastase, as well as the comprehensive blood panel ordered by weight management provider 2 days ago. Pending lab findings, could consider possible trial of bile acid sequestrant vs other medications. If needed, further workup could include abdominal imaging, possible EGD if upper abdominal bloating, nausea symptoms persist.    -Labs as denoted above.  -Next steps pending the above.    Thank you for this consultation. It was a pleasure to participate in the care of this patient; please contact us with any further questions.  A total of 25 face-to-face minutes was spent with this patient, >50% of which was counseling regarding the above delineated issues. An additional 15 minutes was spent on the date of the encounter doing chart review, documentation, and further activities as noted above.    This note was created with voice  recognition software, and while reviewed for accuracy, typos may remain.     Angelic Torres PA-C  Division of Gastroenterology, Hepatology and Nutrition  [facility]    ---------------------------------------------------------------------------------------------------  HPI:  Domitila Luo is a 55 year old female with past medical history significant for HTN and prior gastric bypass (2015) who presents for evaluation of diarrhea. This started in early 2023. Workup with PCP including labs and diagnostic colonoscopy with random colon biopsies on 2/1/24 was unremarkable. Her symptoms improved to some degree over the spring and summer months but worsened again this past Fall. She is experiencing watery diarrhea 3-4 times per day. This starts after eating. Does not seem to matter what she eats. Has limited gluten, sugar, dairy, and coffee but the problem persists. She endorses fecal urgency and occasional fecal incontinence, along with mild lower abdominal cramping. Has not noticed any blood in the stools. Reports a foul odor. Has not tried any over the counter medications to treat diarrhea.     She also describes upper abdominal bloating and feeling of nausea at times when she eats. She will notice that her mouth rosenberg as if she will need to vomit, but she has not actually vomited. She denies any dysphagia, heartburn, acid reflux, or weight loss. Started taking phentermine last year due to slight weight gain, but diarrhea started prior to this.     PREVIOUS ENDOSCOPY:  COLONOSCOPY 02/01/2024   Findings:       The perianal and digital rectal examinations were normal.        The colon (entire examined portion) appeared normal. Biopsies for        histology were taken with a cold forceps from the entire colon for        evaluation of microscopic colitis.        The terminal ileum appeared normal.        The exam was otherwise without abnormality on direct and retroflexion         views.     Impression:               - The entire examined colon is normal. Biopsied.                             - The examined portion of the ileum was normal.                             - The examination was otherwise normal on direct                             and retroflexion views.     Final Diagnosis   A(1). RANDOM COLON, BIOPSY:  - Colonic mucosa with no significant histopathologic abnormality  - No evidence of chronic active or microscopic colitis        PROBLEM LIST  Patient Active Problem List    Diagnosis Date Noted    Chronic diarrhea 01/15/2024     Priority: Medium    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     Priority: Medium    Tremor 05/03/2023     Priority: Medium    History of hypertension 04/17/2023     Priority: Medium    Dizziness 04/17/2023     Priority: Medium    H/O diabetes mellitus 12/19/2016     Priority: Medium    Hyperlipidemia LDL goal <100 12/19/2016     Priority: Medium    Overweight 07/08/2016     Priority: Medium    Postsurgical malabsorption 07/08/2016     Priority: Medium    Tendinitis of right wrist 05/27/2016     Priority: Medium    Knee pain, unspecified laterality 04/13/2016     Priority: Medium    Stress 04/13/2016     Priority: Medium    S/P gastric bypass 08/17/2015     Priority: Medium    KANDIS (obstructive sleep apnea) 08/17/2015     Priority: Medium    Hypertriglyceridemia 08/17/2015     Priority: Medium    Hx of LASIK 06/17/2015     Priority: Medium    No diabetic retinopathy in both eyes 06/17/2015     Priority: Medium    Bariatric surgery status 06/02/2015     Priority: Medium    Encounter for Essure implantation 02/06/2015     Priority: Medium    Stress incontinence 02/06/2015     Priority: Medium    Plantar fasciitis 02/06/2015     Priority: Medium    Snoring 01/27/2015     Priority: Medium    Fatigue 01/27/2015     Priority: Medium    Recurrent genital herpes 01/26/2015     Priority: Medium    Hypokalemia 03/31/2014     Priority: Medium    Benign essential hypertension  11/21/2013     Priority: Medium    Migraine 11/21/2013     Priority: Medium    TERRANCE (generalized anxiety disorder) 11/20/2013     Priority: Medium     Diagnosis updated by automated process. Provider to review and confirm.      S/P epigastric hernia repair 11/20/2013     Priority: Medium       PERTINENT PAST MEDICAL HISTORY:  Past Medical History:   Diagnosis Date    Diabetes mellitus     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    Fatigue 01/27/2015    Hypertension     Lateral epicondylitis 11/21/2013    Sleep apnea     uses cpap    Surgical complications        PREVIOUS SURGERIES:  Past Surgical History:   Procedure Laterality Date    ABDOMINOPLASTY  12/16/2022    COLONOSCOPY N/A 02/01/2024    Procedure: COLONOSCOPY, WITH POLYPECTOMY AND BIOPSY;  Surgeon: Josephine Quijano MD;  Location: [facility] OR    COLONOSCOPY WITH CO2 INSUFFLATION N/A 02/01/2024    Procedure: Colonoscopy with CO2 insufflation;  Surgeon: Josephine Quijano MD;  Location: [facility] OR    FOOT SURGERY      HERNIA REPAIR      LAPAROSCOPIC BYPASS GASTRIC N/A 05/28/2015    Procedure: LAPAROSCOPIC BYPASS GASTRIC;  Surgeon: Eulalio Loza MD;  Location:  OR    LASIK Right     LIVER BIOPSY      Z STOMACH SURGERY PROCEDURE UNLISTED         ALLERGIES:     Allergies   Allergen Reactions    Succinylcholine Other (See Comments)     Unable to wake after ankle surgery    Vicodin [Hydrocodone-Acetaminophen] Hives    Lisinopril      Dry cough    Penicillins      Unknown reaction in childhood       PERTINENT MEDICATIONS:  Current Outpatient Medications   Medication Sig Dispense Refill    Acetaminophen (TYLENOL PO) Take 325 mg by mouth every 6 hours as needed for mild pain or fever      acetaZOLAMIDE (DIAMOX) 125 MG tablet TAKE 1 TABLET(125 MG) BY MOUTH DAILY 30 tablet 0    Ascorbic Acid (VITAMIN C PO) Take 500 mg by mouth every morning       Calcium Carbonate-Vitamin D (CALCIUM + D PO) Take 600 mg by mouth 2 times daily (with meals)      Cyanocobalamin (B-12 PO) Take 1 drop  by mouth every morning 1000 mcg per drop      Ferrous Sulfate (IRON SUPPLEMENT PO) Take 65 mg by mouth every morning      FLUoxetine (PROZAC) 40 MG capsule Take 1 capsule (40 mg) by mouth daily. Profile Rx 90 capsule 1    lifitegrast (XIIDRA) 5 % opthalmic solution Place 1 drop into both eyes 2 times daily 60 each 11    losartan-hydrochlorothiazide (HYZAAR) 100-25 MG tablet Take 1 tablet by mouth every morning. 90 tablet 1    Multiple Vitamins-Minerals (MULTIVITAMIN PO) Take 2 tablets by mouth every morning       order for DME Equipment being ordered: right wrist splint- 1 Units 0    phentermine 15 MG capsule Take 1 capsule (15 mg) by mouth every morning. 90 capsule 0    propranolol (INDERAL) 20 MG tablet Take 1 tablet (20 mg) by mouth nightly as needed (tremor, anxiety,). 90 tablet 3    SUMAtriptan (IMITREX) 25 MG tablet Take 1 tablet (25 mg) by mouth at onset of headache for migraine May repeat in 2 hours. Max 8 tablets/24 hours. 18 tablet 1    valACYclovir (VALTREX) 500 MG tablet Take 1 tablet (500 mg) by mouth daily 90 tablet 3    Vitamin D, Cholecalciferol, 25 MCG (1000 UT) CAPS Take 2,000 Units by mouth every morning       No current facility-administered medications for this visit.       SOCIAL HISTORY:  Social History     Socioeconomic History    Marital status:      Spouse name: Not on file    Number of children: Not on file    Years of education: Not on file    Highest education level: Not on file   Occupational History    Not on file   Tobacco Use    Smoking status: Never    Smokeless tobacco: Never   Vaping Use    Vaping status: Never Used   Substance and Sexual Activity    Alcohol use: No    Drug use: No    Sexual activity: Yes   Other Topics Concern    Not on file   Social History Narrative    Not on file     Social Drivers of Health     Financial Resource Strain: Low Risk  (5/24/2024)    Financial Resource Strain     Within the past 12 months, have you or your family members you live with been  unable to get utilities (heat, electricity) when it was really needed?: No   Food Insecurity: Low Risk  (5/24/2024)    Food Insecurity     Within the past 12 months, did you worry that your food would run out before you got money to buy more?: No     Within the past 12 months, did the food you bought just not last and you didn t have money to get more?: No   Transportation Needs: Low Risk  (5/24/2024)    Transportation Needs     Within the past 12 months, has lack of transportation kept you from medical appointments, getting your medicines, non-medical meetings or appointments, work, or from getting things that you need?: No   Physical Activity: Unknown (5/24/2024)    Exercise Vital Sign     Days of Exercise per Week: 5 days     Minutes of Exercise per Session: Not on file   Stress: Stress Concern Present (5/24/2024)    Citizen of [place] [employer] - Occupational Stress Questionnaire     Feeling of Stress : To some extent   Social Connections: Unknown (5/24/2024)    Social Connection and Isolation Panel [NHANES]     Frequency of Communication with Friends and Family: Not on file     Frequency of Social Gatherings with Friends and Family: More than three times a week     Attends [employer] Services: Not on file     Active Member of Clubs or Organizations: Not on file     Attends Club or Organization Meetings: Not on file     Marital Status: Not on file   Interpersonal Safety: Low Risk  (5/24/2024)    Interpersonal Safety     Do you feel physically and emotionally safe where you currently live?: Yes     Within the past 12 months, have you been hit, slapped, kicked or otherwise physically hurt by someone?: No     Within the past 12 months, have you been humiliated or emotionally abused in other ways by your partner or ex-partner?: No   Housing Stability: Low Risk  (5/24/2024)    Housing Stability     Do you have housing? : Yes     Are you worried about losing your housing?: No       FAMILY HISTORY:  Family  "History   Problem Relation Age of Onset    Hypertension Maternal Grandmother     Cancer Sister         liver cancer    Thyroid Disease Sister     Thyroid Disease Mother     Cerebrovascular Disease Paternal Uncle     Glaucoma No family hx of     Macular Degeneration No family hx of     Diabetes Paternal Grandmother     Diabetes Other        Past/family/social history reviewed and no changes    PHYSICAL EXAMINATION:  Vitals /85   Pulse 64   Ht 1.632 m (5' 4.25\")   Wt 73.5 kg (162 lb)   LMP 05/15/2023 (Approximate)   SpO2 98%   BMI 27.59 kg/m     Wt   Wt Readings from Last 2 Encounters:   02/19/25 73.5 kg (162 lb)   02/17/25 71.7 kg (158 lb)      Gen: Resting comfortably in an exam chair, alert, no distress  Eyes: sclerae anicteric  ENT:  OP clear, MMM  Resp: No increased respiratory effort  Skin: Visible skin clear. No significant rash, abnormal pigmentation or lesions.  Neuro: Cranial nerves grossly intact.  Mentation and speech appropriate for age.  Psych: Appropriate affect, tone, and pace of words    PERTINENT STUDIES:  Reviewed    "

## 2025-02-19 ENCOUNTER — OFFICE VISIT (OUTPATIENT)
Dept: GASTROENTEROLOGY | Facility: CLINIC | Age: 55
End: 2025-02-19
Attending: FAMILY MEDICINE
Payer: COMMERCIAL

## 2025-02-19 ENCOUNTER — LAB (OUTPATIENT)
Dept: LAB | Facility: CLINIC | Age: 55
End: 2025-02-19
Payer: COMMERCIAL

## 2025-02-19 VITALS
BODY MASS INDEX: 27.66 KG/M2 | WEIGHT: 162 LBS | DIASTOLIC BLOOD PRESSURE: 85 MMHG | OXYGEN SATURATION: 98 % | HEIGHT: 64 IN | SYSTOLIC BLOOD PRESSURE: 123 MMHG | HEART RATE: 64 BPM

## 2025-02-19 DIAGNOSIS — Z98.84 S/P GASTRIC BYPASS: ICD-10-CM

## 2025-02-19 DIAGNOSIS — Z98.84 BARIATRIC SURGERY STATUS: ICD-10-CM

## 2025-02-19 DIAGNOSIS — K52.9 CHRONIC DIARRHEA: ICD-10-CM

## 2025-02-19 DIAGNOSIS — K91.2 POSTSURGICAL MALABSORPTION: ICD-10-CM

## 2025-02-19 DIAGNOSIS — K52.9 CHRONIC DIARRHEA: Primary | ICD-10-CM

## 2025-02-19 LAB
ALBUMIN SERPL BCG-MCNC: 4.5 G/DL (ref 3.5–5.2)
ALP SERPL-CCNC: 121 U/L (ref 40–150)
ALT SERPL W P-5'-P-CCNC: 43 U/L (ref 0–50)
ANION GAP SERPL CALCULATED.3IONS-SCNC: 10 MMOL/L (ref 7–15)
AST SERPL W P-5'-P-CCNC: 33 U/L (ref 0–45)
BILIRUB SERPL-MCNC: 0.6 MG/DL
BUN SERPL-MCNC: 25.7 MG/DL (ref 6–20)
CALCIUM SERPL-MCNC: 9.9 MG/DL (ref 8.8–10.4)
CHLORIDE SERPL-SCNC: 104 MMOL/L (ref 98–107)
CREAT SERPL-MCNC: 0.92 MG/DL (ref 0.51–0.95)
CRP SERPL-MCNC: <3 MG/L
EGFRCR SERPLBLD CKD-EPI 2021: 73 ML/MIN/1.73M2
ERYTHROCYTE [DISTWIDTH] IN BLOOD BY AUTOMATED COUNT: 12.1 % (ref 10–15)
FERRITIN SERPL-MCNC: 60 NG/ML (ref 11–328)
GLUCOSE SERPL-MCNC: 99 MG/DL (ref 70–99)
HCO3 SERPL-SCNC: 26 MMOL/L (ref 22–29)
HCT VFR BLD AUTO: 43 % (ref 35–47)
HGB BLD-MCNC: 14.1 G/DL (ref 11.7–15.7)
IRON BINDING CAPACITY (ROCHE): 390 UG/DL (ref 240–430)
IRON SATN MFR SERPL: 27 % (ref 15–46)
IRON SERPL-MCNC: 106 UG/DL (ref 37–145)
MCH RBC QN AUTO: 30.1 PG (ref 26.5–33)
MCHC RBC AUTO-ENTMCNC: 32.8 G/DL (ref 31.5–36.5)
MCV RBC AUTO: 92 FL (ref 78–100)
PLATELET # BLD AUTO: 293 10E3/UL (ref 150–450)
POTASSIUM SERPL-SCNC: 3.9 MMOL/L (ref 3.4–5.3)
PROT SERPL-MCNC: 7.2 G/DL (ref 6.4–8.3)
PTH-INTACT SERPL-MCNC: 36 PG/ML (ref 15–65)
RBC # BLD AUTO: 4.69 10E6/UL (ref 3.8–5.2)
SODIUM SERPL-SCNC: 140 MMOL/L (ref 135–145)
TSH SERPL DL<=0.005 MIU/L-ACNC: 2.33 UIU/ML (ref 0.3–4.2)
VIT B12 SERPL-MCNC: 808 PG/ML (ref 232–1245)
VIT D+METAB SERPL-MCNC: 51 NG/ML (ref 20–50)
WBC # BLD AUTO: 5.7 10E3/UL (ref 4–11)

## 2025-02-19 PROCEDURE — 82784 ASSAY IGA/IGD/IGG/IGM EACH: CPT

## 2025-02-19 PROCEDURE — 86364 TISS TRNSGLTMNASE EA IG CLAS: CPT

## 2025-02-19 PROCEDURE — 99203 OFFICE O/P NEW LOW 30 MIN: CPT | Performed by: PHYSICIAN ASSISTANT

## 2025-02-19 PROCEDURE — 86140 C-REACTIVE PROTEIN: CPT

## 2025-02-19 ASSESSMENT — PAIN SCALES - GENERAL: PAINLEVEL_OUTOF10: NO PAIN (0)

## 2025-02-19 NOTE — PATIENT INSTRUCTIONS
It was a pleasure meeting with you today and discussing your healthcare plan. Below is a summary of what we covered:    Labs today for blood work.    Take home stool test. Return the sample to any convenient [facility] clinic.    Depending on labs, we will determine a treatment plan versus need for further testing.    Please see below for any additional questions and scheduling guidelines.    Sign up for [employer] [facility]: [employer] patient portal serves as a secure platform for accessing your medical records from the [facility] ER. Additionally, [employer] facilitates easy, timely, and secure messaging with your care team. If you have not signed up, you may do so by using the provided code or calling [phone].    Coordinating your care after your visit:  There are multiple options for scheduling your follow-up care based on your provider's recommendation.    How do I schedule a follow-up clinic appointment:   After your appointment, you may receive scheduling assistance with the Clinic Coordinators by having a seat in the waiting room and a Clinic Coordinator will call you up to schedule.  Virtual visits or after you leave the clinic:  Your provider has placed a follow-up order in the [employer] portal for scheduling your return appointment. A member of the scheduling team will contact you to schedule.  [facility] Scheduling: Timely scheduling through [employer] is advised to ensure appointment availability.   Call to schedule: You may schedule your follow-up appointment(s) by calling [phone], option 1.    How do I schedule my endoscopy or colonoscopy procedure:  If a procedure, such as a colonoscopy or upper endoscopy was ordered by your provider, the scheduling team will contact you to schedule this procedure. Or you may choose to call to schedule at   [phone], option 2.  Please allow 20-30 minutes when scheduling a procedure.    How do I get my blood work done? To get your blood work done, you need to  schedule a lab appointment at an [facility]. There are multiple ways to schedule:   At the clinic: The Clinic Coordinator you meet after your visit can help you schedule a lab appointment.   [employer] scheduling: [employer] offers online lab scheduling at all [facility] laboratory locations.   Call to schedule: You can call [phone] to schedule your lab appointment.    How do I schedule my imaging study: To schedule imaging studies, such as CT scans, ultrasounds, MRIs, or X-rays, contact Imaging Services at [phone].    How do I schedule a referral to another doctor: If your provider recommended a referral to another specialist(s), the referral order was placed by your provider. You will receive a phone call to schedule this referral, or you may choose to call the number attached to the referral to self-schedule.    For Post-Visit Question(s):  For any inquiries following today's visit:  Please utilize [employer] messaging and allow 48 hours for reply or contact the Call Center during normal business hours at [phone], option 3.  For Emergent After-hours questions, contact the On-Call GI Fellow through the [facility]  at [phone].  In addition, you may contact your Nurse directly using the provided contact information.    Test Results:  Test results will be accessible via [employer] in compliance with the 21st Century Cures Act. This means that your results will be available to you at the same time as your provider. Often you may see your results before your provider does. Results are reviewed by staff within two weeks with communication follow-up. Results may be released in the patient portal prior to your care team review.    Prescription Refill(s):  Medication prescribed by your provider will be addressed during your visit. For future refills, please coordinate with your pharmacy. If you have not had a recent clinic visit or routine labs, for your safety, your  provider may not be able to refill your prescription.

## 2025-02-19 NOTE — LETTER
2/19/2025      Domitila Luo  [address] [place] [address]      Dear Colleague,    Thank you for referring your patient, Domitila Luo, to the [facility]. Please see a copy of my visit note below.    NEW PATIENT GI CLINIC VISIT    CC/REFERRING MD:  Marcos Ko  REASON FOR CONSULTATION:   Domitila Luo is a 55 year old female who I was asked to see in consultation at the request of Dr. Marcos Ko for   Chief Complaint   Patient presents with     New Patient       ASSESSMENT/PLAN:  55 year old female s/p gastric bypass surgery (2015) with chronic diarrhea. Started in 2023 - workup with PCP including labs and diagnostic colonoscopy with random colon biopsies on 2/1/24 was unremarkable. Symptoms improved temporarily in the Spring/Summer of 2024 but recurred in the Fall. Currently experiencing 3-4 watery stools per day which seem to occur post-prandially. Some associated upper abdominal bloating and nausea, as well as mild lower abdominal cramping and fecal urgency. We discussed possible etiologies including malabsorption, dumping syndrome, medication side effect, biliary pathology, infection (unlikely given time frame), inflammation, IBS-D, vs other. Will proceed with some additional laboratory workup including Celiac serologies, inflammatory markers (CRP, fecal calprotectin), fecal elastase, as well as the comprehensive blood panel ordered by weight management provider 2 days ago. Pending lab findings, could consider possible trial of bile acid sequestrant vs other medications. If needed, further workup could include abdominal imaging, possible EGD if upper abdominal bloating, nausea symptoms persist.    -Labs as denoted above.  -Next steps pending the above.    Thank you for this consultation. It was a pleasure to participate in the care of this patient; please contact us with any further questions.  A total of 25 face-to-face minutes was spent with this patient,  >50% of which was counseling regarding the above delineated issues. An additional 15 minutes was spent on the date of the encounter doing chart review, documentation, and further activities as noted above.    This note was created with voice recognition software, and while reviewed for accuracy, typos may remain.     Angelic Torres PA-C  Division of Gastroenterology, Hepatology and Nutrition  [facility]    ---------------------------------------------------------------------------------------------------  HPI:  Domitila Luo is a 55 year old female with past medical history significant for HTN and prior gastric bypass (2015) who presents for evaluation of diarrhea. This started in early 2023. Workup with PCP including labs and diagnostic colonoscopy with random colon biopsies on 2/1/24 was unremarkable. Her symptoms improved to some degree over the spring and summer months but worsened again this past Fall. She is experiencing watery diarrhea 3-4 times per day. This starts after eating. Does not seem to matter what she eats. Has limited gluten, sugar, dairy, and coffee but the problem persists. She endorses fecal urgency and occasional fecal incontinence, along with mild lower abdominal cramping. Has not noticed any blood in the stools. Reports a foul odor. Has not tried any over the counter medications to treat diarrhea.     She also describes upper abdominal bloating and feeling of nausea at times when she eats. She will notice that her mouth rosenberg as if she will need to vomit, but she has not actually vomited. She denies any dysphagia, heartburn, acid reflux, or weight loss. Started taking phentermine last year due to slight weight gain, but diarrhea started prior to this.     PREVIOUS ENDOSCOPY:  COLONOSCOPY 02/01/2024   Findings:       The perianal and digital rectal examinations were normal.        The colon (entire examined portion) appeared normal. Biopsies  for        histology were taken with a cold forceps from the entire colon for        evaluation of microscopic colitis.        The terminal ileum appeared normal.        The exam was otherwise without abnormality on direct and retroflexion        views.     Impression:               - The entire examined colon is normal. Biopsied.                             - The examined portion of the ileum was normal.                             - The examination was otherwise normal on direct                             and retroflexion views.     Final Diagnosis   A(1). RANDOM COLON, BIOPSY:  - Colonic mucosa with no significant histopathologic abnormality  - No evidence of chronic active or microscopic colitis        PROBLEM LIST  Patient Active Problem List    Diagnosis Date Noted     Chronic diarrhea 01/15/2024     Priority: Medium     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     Priority: Medium     Tremor 05/03/2023     Priority: Medium     History of hypertension 04/17/2023     Priority: Medium     Dizziness 04/17/2023     Priority: Medium     H/O diabetes mellitus 12/19/2016     Priority: Medium     Hyperlipidemia LDL goal <100 12/19/2016     Priority: Medium     Overweight 07/08/2016     Priority: Medium     Postsurgical malabsorption 07/08/2016     Priority: Medium     Tendinitis of right wrist 05/27/2016     Priority: Medium     Knee pain, unspecified laterality 04/13/2016     Priority: Medium     Stress 04/13/2016     Priority: Medium     S/P gastric bypass 08/17/2015     Priority: Medium     KANDIS (obstructive sleep apnea) 08/17/2015     Priority: Medium     Hypertriglyceridemia 08/17/2015     Priority: Medium     Hx of LASIK 06/17/2015     Priority: Medium     No diabetic retinopathy in both eyes 06/17/2015     Priority: Medium     Bariatric surgery status 06/02/2015     Priority: Medium     Encounter for Essure implantation 02/06/2015     Priority: Medium     Stress incontinence 02/06/2015     Priority: Medium     Plantar  fasciitis 02/06/2015     Priority: Medium     Snoring 01/27/2015     Priority: Medium     Fatigue 01/27/2015     Priority: Medium     Recurrent genital herpes 01/26/2015     Priority: Medium     Hypokalemia 03/31/2014     Priority: Medium     Benign essential hypertension 11/21/2013     Priority: Medium     Migraine 11/21/2013     Priority: Medium     TERRANCE (generalized anxiety disorder) 11/20/2013     Priority: Medium     Diagnosis updated by automated process. Provider to review and confirm.       S/P epigastric hernia repair 11/20/2013     Priority: Medium       PERTINENT PAST MEDICAL HISTORY:  Past Medical History:   Diagnosis Date     Diabetes mellitus      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     Fatigue 01/27/2015     Hypertension      Lateral epicondylitis 11/21/2013     Sleep apnea     uses cpap     Surgical complications        PREVIOUS SURGERIES:  Past Surgical History:   Procedure Laterality Date     ABDOMINOPLASTY  12/16/2022     COLONOSCOPY N/A 02/01/2024    Procedure: COLONOSCOPY, WITH POLYPECTOMY AND BIOPSY;  Surgeon: Josephine Quijano MD;  Location: [facility] OR     COLONOSCOPY WITH CO2 INSUFFLATION N/A 02/01/2024    Procedure: Colonoscopy with CO2 insufflation;  Surgeon: Josephine Quijano MD;  Location: [facility] OR     FOOT SURGERY       HERNIA REPAIR       LAPAROSCOPIC BYPASS GASTRIC N/A 05/28/2015    Procedure: LAPAROSCOPIC BYPASS GASTRIC;  Surgeon: Eulalio Loza MD;  Location:  OR     [facility]      LIVER BIOPSY       [facility] STOMACH SURGERY PROCEDURE UNLISTED         ALLERGIES:     Allergies   Allergen Reactions     Succinylcholine Other (See Comments)     Unable to wake after ankle surgery     Vicodin [Hydrocodone-Acetaminophen] Hives     Lisinopril      Dry cough     Penicillins      Unknown reaction in childhood       PERTINENT MEDICATIONS:  Current Outpatient Medications   Medication Sig Dispense Refill     Acetaminophen (TYLENOL PO) Take 325 mg by mouth every 6 hours as needed for mild pain or fever        acetaZOLAMIDE (DIAMOX) 125 MG tablet TAKE 1 TABLET(125 MG) BY MOUTH DAILY 30 tablet 0     Ascorbic Acid (VITAMIN C PO) Take 500 mg by mouth every morning        Calcium Carbonate-Vitamin D (CALCIUM + D PO) Take 600 mg by mouth 2 times daily (with meals)       Cyanocobalamin (B-12 PO) Take 1 drop by mouth every morning 1000 mcg per drop       Ferrous Sulfate (IRON SUPPLEMENT PO) Take 65 mg by mouth every morning       FLUoxetine (PROZAC) 40 MG capsule Take 1 capsule (40 mg) by mouth daily. Profile Rx 90 capsule 1     lifitegrast (XIIDRA) 5 % opthalmic solution Place 1 drop into both eyes 2 times daily 60 each 11     losartan-hydrochlorothiazide (HYZAAR) 100-25 MG tablet Take 1 tablet by mouth every morning. 90 tablet 1     Multiple Vitamins-Minerals (MULTIVITAMIN PO) Take 2 tablets by mouth every morning        order for DME Equipment being ordered: right wrist splint- 1 Units 0     phentermine 15 MG capsule Take 1 capsule (15 mg) by mouth every morning. 90 capsule 0     propranolol (INDERAL) 20 MG tablet Take 1 tablet (20 mg) by mouth nightly as needed (tremor, anxiety,). 90 tablet 3     SUMAtriptan (IMITREX) 25 MG tablet Take 1 tablet (25 mg) by mouth at onset of headache for migraine May repeat in 2 hours. Max 8 tablets/24 hours. 18 tablet 1     valACYclovir (VALTREX) 500 MG tablet Take 1 tablet (500 mg) by mouth daily 90 tablet 3     Vitamin D, Cholecalciferol, 25 MCG (1000 UT) CAPS Take 2,000 Units by mouth every morning       No current facility-administered medications for this visit.       SOCIAL HISTORY:  Social History     Socioeconomic History     Marital status:      Spouse name: Not on file     Number of children: Not on file     Years of education: Not on file     Highest education level: Not on file   Occupational History     Not on file   Tobacco Use     Smoking status: Never     Smokeless tobacco: Never   Vaping Use     Vaping status: Never Used   Substance and Sexual Activity     Alcohol  use: No     Drug use: No     Sexual activity: Yes   Other Topics Concern     Not on file   Social History Narrative     Not on file     Social Drivers of Health     Financial Resource Strain: Low Risk  (5/24/2024)    Financial Resource Strain      Within the past 12 months, have you or your family members you live with been unable to get utilities (heat, electricity) when it was really needed?: No   Food Insecurity: Low Risk  (5/24/2024)    Food Insecurity      Within the past 12 months, did you worry that your food would run out before you got money to buy more?: No      Within the past 12 months, did the food you bought just not last and you didn t have money to get more?: No   Transportation Needs: Low Risk  (5/24/2024)    Transportation Needs      Within the past 12 months, has lack of transportation kept you from medical appointments, getting your medicines, non-medical meetings or appointments, work, or from getting things that you need?: No   Physical Activity: Unknown (5/24/2024)    Exercise Vital Sign      Days of Exercise per Week: 5 days      Minutes of Exercise per Session: Not on file   Stress: Stress Concern Present (5/24/2024)    Citizen of [place] [employer] - Occupational Stress Questionnaire      Feeling of Stress : To some extent   Social Connections: Unknown (5/24/2024)    Social Connection and Isolation Panel [NHANES]      Frequency of Communication with Friends and Family: Not on file      Frequency of Social Gatherings with Friends and Family: More than three times a week      Attends [employer]ology Services: Not on file      Active Member of Clubs or Organizations: Not on file      Attends Club or Organization Meetings: Not on file      Marital Status: Not on file   Interpersonal Safety: Low Risk  (5/24/2024)    Interpersonal Safety      Do you feel physically and emotionally safe where you currently live?: Yes      Within the past 12 months, have you been hit, slapped, kicked or  "otherwise physically hurt by someone?: No      Within the past 12 months, have you been humiliated or emotionally abused in other ways by your partner or ex-partner?: No   Housing Stability: Low Risk  (5/24/2024)    Housing Stability      Do you have housing? : Yes      Are you worried about losing your housing?: No       FAMILY HISTORY:  Family History   Problem Relation Age of Onset     Hypertension Maternal Grandmother      Cancer Sister         liver cancer     Thyroid Disease Sister      Thyroid Disease Mother      Cerebrovascular Disease Paternal Uncle      Glaucoma No family hx of      Macular Degeneration No family hx of      Diabetes Paternal Grandmother      Diabetes Other        Past/family/social history reviewed and no changes    PHYSICAL EXAMINATION:  Vitals /85   Pulse 64   Ht 1.632 m (5' 4.25\")   Wt 73.5 kg (162 lb)   LMP 05/15/2023 (Approximate)   SpO2 98%   BMI 27.59 kg/m     Wt   Wt Readings from Last 2 Encounters:   02/19/25 73.5 kg (162 lb)   02/17/25 71.7 kg (158 lb)      Gen: Resting comfortably in an exam chair, alert, no distress  Eyes: sclerae anicteric  ENT:  OP clear, MMM  Resp: No increased respiratory effort  Skin: Visible skin clear. No significant rash, abnormal pigmentation or lesions.  Neuro: Cranial nerves grossly intact.  Mentation and speech appropriate for age.  Psych: Appropriate affect, tone, and pace of words    PERTINENT STUDIES:  Reviewed      Again, thank you for allowing me to participate in the care of your patient.        Sincerely,        Angelic Torres PA-C    Electronically signed"

## 2025-02-19 NOTE — NURSING NOTE
"Chief Complaint   Patient presents with    New Patient     She requests these members of her care team be copied on today's visit information:  PCP: Marcos Ko    Referring Provider:  Marcos Ko MD  [address] [place] [address]    Vitals:    02/19/25 0702   BP: 123/85   Pulse: 64   SpO2: 98%   Weight: 73.5 kg (162 lb)   Height: 1.632 m (5' 4.25\")     Body mass index is 27.59 kg/m .    Do you have a history of colon cancer in your immediate family? NO    Medications were reconciled.      Beena Ly, Clinical Assistant      "

## 2025-02-20 DIAGNOSIS — K91.2 POSTSURGICAL MALABSORPTION: Primary | ICD-10-CM

## 2025-02-20 LAB
IGA SERPL-MCNC: 191 MG/DL (ref 84–499)
TTG IGA SER-ACNC: 0.5 U/ML
TTG IGG SER-ACNC: 0.7 U/ML

## 2025-02-22 LAB
ANNOTATION COMMENT IMP: NORMAL
RETINYL PALMITATE SERPL-MCNC: <0.02 MG/L
VIT A SERPL-MCNC: 0.74 MG/L
VIT B1 PYROPHOSHATE BLD-SCNC: 240 NMOL/L

## 2025-02-24 DIAGNOSIS — K91.2 POSTSURGICAL MALABSORPTION: Primary | ICD-10-CM

## 2025-03-31 DIAGNOSIS — E66.3 OVERWEIGHT WITH BODY MASS INDEX (BMI) OF 27 TO 27.9 IN ADULT: ICD-10-CM

## 2025-03-31 RX ORDER — PHENTERMINE HYDROCHLORIDE 15 MG/1
15 CAPSULE ORAL EVERY MORNING
Qty: 90 CAPSULE | OUTPATIENT
Start: 2025-03-31

## 2025-03-31 RX ORDER — PHENTERMINE HYDROCHLORIDE 15 MG/1
15 CAPSULE ORAL EVERY MORNING
Qty: 90 CAPSULE | Refills: 0 | Status: SHIPPED | OUTPATIENT
Start: 2025-03-31

## 2025-04-24 ENCOUNTER — PATIENT OUTREACH (OUTPATIENT)
Dept: CARE COORDINATION | Facility: CLINIC | Age: 55
End: 2025-04-24
Payer: COMMERCIAL

## 2025-05-08 ENCOUNTER — PATIENT OUTREACH (OUTPATIENT)
Dept: CARE COORDINATION | Facility: CLINIC | Age: 55
End: 2025-05-08
Payer: COMMERCIAL

## 2025-05-15 ENCOUNTER — PATIENT OUTREACH (OUTPATIENT)
Dept: CARE COORDINATION | Facility: CLINIC | Age: 55
End: 2025-05-15
Payer: COMMERCIAL

## 2025-06-09 NOTE — RESULT ENCOUNTER NOTE
Mahendra Ramesh,  Your recent labs showed normal potassium and kidney functions.  These are good  Your blood pressure is at goal, let us continue current medications with no change.   Let me know if you have any questions. Take care.  Marcos Ko MD   [Normal Development] : Normal Development [None] : none [FreeTextEntry1] : GM - 3.4 FMA - 3.7 PS - 3 L -3.10

## 2025-06-12 ENCOUNTER — PATIENT OUTREACH (OUTPATIENT)
Dept: CARE COORDINATION | Facility: CLINIC | Age: 55
End: 2025-06-12
Payer: COMMERCIAL

## 2025-06-25 ENCOUNTER — OFFICE VISIT (OUTPATIENT)
Dept: FAMILY MEDICINE | Facility: CLINIC | Age: 55
End: 2025-06-25
Payer: COMMERCIAL

## 2025-06-25 VITALS
TEMPERATURE: 97.4 F | SYSTOLIC BLOOD PRESSURE: 137 MMHG | BODY MASS INDEX: 29.06 KG/M2 | HEART RATE: 59 BPM | DIASTOLIC BLOOD PRESSURE: 80 MMHG | RESPIRATION RATE: 14 BRPM | OXYGEN SATURATION: 100 % | HEIGHT: 64 IN | WEIGHT: 170.2 LBS

## 2025-06-25 DIAGNOSIS — Z12.4 CERVICAL CANCER SCREENING: ICD-10-CM

## 2025-06-25 DIAGNOSIS — Z11.3 SCREENING EXAMINATION FOR STI: ICD-10-CM

## 2025-06-25 DIAGNOSIS — G43.709 CHRONIC MIGRAINE WITHOUT AURA WITHOUT STATUS MIGRAINOSUS, NOT INTRACTABLE: ICD-10-CM

## 2025-06-25 DIAGNOSIS — R25.1 TREMOR: ICD-10-CM

## 2025-06-25 DIAGNOSIS — N89.8 VAGINAL DISCHARGE: ICD-10-CM

## 2025-06-25 DIAGNOSIS — Z13.6 SCREENING FOR CARDIOVASCULAR CONDITION: ICD-10-CM

## 2025-06-25 DIAGNOSIS — Z98.84 S/P GASTRIC BYPASS: ICD-10-CM

## 2025-06-25 DIAGNOSIS — F41.1 GAD (GENERALIZED ANXIETY DISORDER): ICD-10-CM

## 2025-06-25 DIAGNOSIS — Z00.00 ROUTINE GENERAL MEDICAL EXAMINATION AT A HEALTH CARE FACILITY: Primary | ICD-10-CM

## 2025-06-25 DIAGNOSIS — E78.5 HYPERLIPIDEMIA LDL GOAL <100: ICD-10-CM

## 2025-06-25 DIAGNOSIS — Z23 ENCOUNTER FOR IMMUNIZATION: ICD-10-CM

## 2025-06-25 DIAGNOSIS — I10 BENIGN ESSENTIAL HYPERTENSION: ICD-10-CM

## 2025-06-25 DIAGNOSIS — K64.4 EXTERNAL HEMORRHOIDS: ICD-10-CM

## 2025-06-25 LAB
CLUE CELLS: ABNORMAL
EST. AVERAGE GLUCOSE BLD GHB EST-MCNC: 108 MG/DL
HBA1C MFR BLD: 5.4 % (ref 0–5.6)
TRICHOMONAS, WET PREP: ABNORMAL
WBC'S/HIGH POWER FIELD, WET PREP: ABNORMAL
YEAST, WET PREP: PRESENT

## 2025-06-25 PROCEDURE — 90677 PCV20 VACCINE IM: CPT | Performed by: PREVENTIVE MEDICINE

## 2025-06-25 PROCEDURE — 87591 N.GONORRHOEAE DNA AMP PROB: CPT | Performed by: PREVENTIVE MEDICINE

## 2025-06-25 PROCEDURE — 82043 UR ALBUMIN QUANTITATIVE: CPT | Performed by: PREVENTIVE MEDICINE

## 2025-06-25 PROCEDURE — 83036 HEMOGLOBIN GLYCOSYLATED A1C: CPT | Performed by: PREVENTIVE MEDICINE

## 2025-06-25 PROCEDURE — 3079F DIAST BP 80-89 MM HG: CPT | Performed by: PREVENTIVE MEDICINE

## 2025-06-25 PROCEDURE — 3075F SYST BP GE 130 - 139MM HG: CPT | Performed by: PREVENTIVE MEDICINE

## 2025-06-25 PROCEDURE — 80061 LIPID PANEL: CPT | Performed by: PREVENTIVE MEDICINE

## 2025-06-25 PROCEDURE — 82570 ASSAY OF URINE CREATININE: CPT | Performed by: PREVENTIVE MEDICINE

## 2025-06-25 PROCEDURE — 90471 IMMUNIZATION ADMIN: CPT | Performed by: PREVENTIVE MEDICINE

## 2025-06-25 PROCEDURE — 36415 COLL VENOUS BLD VENIPUNCTURE: CPT | Performed by: PREVENTIVE MEDICINE

## 2025-06-25 PROCEDURE — 86803 HEPATITIS C AB TEST: CPT | Performed by: PREVENTIVE MEDICINE

## 2025-06-25 PROCEDURE — 80053 COMPREHEN METABOLIC PANEL: CPT | Performed by: PREVENTIVE MEDICINE

## 2025-06-25 PROCEDURE — 99396 PREV VISIT EST AGE 40-64: CPT | Mod: 25 | Performed by: PREVENTIVE MEDICINE

## 2025-06-25 PROCEDURE — 87340 HEPATITIS B SURFACE AG IA: CPT | Performed by: PREVENTIVE MEDICINE

## 2025-06-25 PROCEDURE — 99214 OFFICE O/P EST MOD 30 MIN: CPT | Mod: 25 | Performed by: PREVENTIVE MEDICINE

## 2025-06-25 PROCEDURE — 86780 TREPONEMA PALLIDUM: CPT | Performed by: PREVENTIVE MEDICINE

## 2025-06-25 PROCEDURE — 87389 HIV-1 AG W/HIV-1&-2 AB AG IA: CPT | Performed by: PREVENTIVE MEDICINE

## 2025-06-25 PROCEDURE — 87491 CHLMYD TRACH DNA AMP PROBE: CPT | Performed by: PREVENTIVE MEDICINE

## 2025-06-25 PROCEDURE — 87624 HPV HI-RISK TYP POOLED RSLT: CPT | Performed by: PREVENTIVE MEDICINE

## 2025-06-25 PROCEDURE — 87210 SMEAR WET MOUNT SALINE/INK: CPT | Performed by: PREVENTIVE MEDICINE

## 2025-06-25 RX ORDER — LOSARTAN POTASSIUM AND HYDROCHLOROTHIAZIDE 25; 100 MG/1; MG/1
1 TABLET ORAL EVERY MORNING
Qty: 90 TABLET | Refills: 3 | Status: SHIPPED | OUTPATIENT
Start: 2025-06-25

## 2025-06-25 RX ORDER — SUMATRIPTAN SUCCINATE 25 MG/1
25 TABLET ORAL
Qty: 18 TABLET | Refills: 1 | Status: SHIPPED | OUTPATIENT
Start: 2025-06-25

## 2025-06-25 RX ORDER — PROPRANOLOL HCL 20 MG
20 TABLET ORAL
Qty: 90 TABLET | Refills: 3 | Status: SHIPPED | OUTPATIENT
Start: 2025-06-25

## 2025-06-25 RX ORDER — FLUOXETINE HYDROCHLORIDE 40 MG/1
40 CAPSULE ORAL DAILY
Qty: 90 CAPSULE | Refills: 3 | Status: SHIPPED | OUTPATIENT
Start: 2025-06-25

## 2025-06-25 RX ORDER — HYDROCORTISONE 25 MG/G
CREAM TOPICAL 2 TIMES DAILY PRN
Qty: 30 G | Refills: 0 | Status: SHIPPED | OUTPATIENT
Start: 2025-06-25

## 2025-06-25 SDOH — HEALTH STABILITY: PHYSICAL HEALTH: ON AVERAGE, HOW MANY DAYS PER WEEK DO YOU ENGAGE IN MODERATE TO STRENUOUS EXERCISE (LIKE A BRISK WALK)?: 4 DAYS

## 2025-06-25 SDOH — HEALTH STABILITY: PHYSICAL HEALTH: ON AVERAGE, HOW MANY MINUTES DO YOU ENGAGE IN EXERCISE AT THIS LEVEL?: 60 MIN

## 2025-06-25 ASSESSMENT — SOCIAL DETERMINANTS OF HEALTH (SDOH): HOW OFTEN DO YOU GET TOGETHER WITH FRIENDS OR RELATIVES?: TWICE A WEEK

## 2025-06-25 NOTE — NURSING NOTE
Prior to immunization administration, verified patients identity using patient s name and date of birth. Please see Immunization Activity for additional information.     Screening Questionnaire for Adult Immunization    Are you sick today?   No   Do you have allergies to medications, food, a vaccine component or latex?   yes   Have you ever had a serious reaction after receiving a vaccination?   No   Do you have a long-term health problem with heart, lung, kidney, or metabolic disease (e.g., diabetes), asthma, a blood disorder, no spleen, complement component deficiency, a cochlear implant, or a spinal fluid leak?  Are you on long-term aspirin therapy?   No   Do you have cancer, leukemia, HIV/AIDS, or any other immune system problem?   No   Do you have a parent, brother, or sister with an immune system problem?   No   In the past 3 months, have you taken medications that affect  your immune system, such as prednisone, other steroids, or anticancer drugs; drugs for the treatment of rheumatoid arthritis, Crohn s disease, or psoriasis; or have you had radiation treatments?   No   Have you had a seizure, or a brain or other nervous system problem?   No   During the past year, have you received a transfusion of blood or blood    products, or been given immune (gamma) globulin or antiviral drug?   No   For women: Are you pregnant or is there a chance you could become       pregnant during the next month?   No   Have you received any vaccinations in the past 4 weeks?   No     Immunization questionnaire was positive for at least one answer.  Notified provider aware.      Patient instructed to remain in clinic for 15 minutes afterwards, and to report any adverse reactions.     Screening performed by Rosario Hilton MA on 6/25/2025 at 3:30 PM.

## 2025-06-25 NOTE — PATIENT INSTRUCTIONS
At [facility], we strive to deliver an exceptional experience to you, every time we see you. If you receive a survey, please let us know what we are doing well and/or what we could improve upon, as we do value your feedback.  If you have MyChart, you can expect to receive results automatically within 24 hours of their completion.  Your provider will send a note interpreting your results as well.   If you do not have MyChart, you should receive your results in about a week by mail.    Your care team:                            Family Medicine Internal Medicine   MD Alejo Mcdaniels, MD Oralia Ward, MD Patric Busch, MD Anny Cortes, PA-C Cathy Knight APRADELFO, YKLIE Mendoza, MD Pediatrics   MD Elizabeth Lo, MD Orquidea Major, PABERNARD Reyna APRN CNP   Talisha Hall, MD Demetrice Teixeira, MD Lesli Barboza, CNP      Same-Day Provider (No follow-up visits)    LÓPEZ Sher PA-C     Clinic hours: Monday - Thursday 7 am-6 pm; Fridays 7 am-5 pm.   Urgent care: Monday - Friday 10 am- 8 pm; Saturday and Sunday 9 am-5 pm.    Clinic: [phone]       [facility]: Monday - Thursday 8 am - 7 pm; Friday 8 am - 6 pm  [facility]: [phone]     [facility] Scheduling: Monday - Friday 7 am - 7 pm; Saturday 7 am - 3:30 pm  (501) [place] : [phone]    Patient Education   Preventive Care Advice   This is general advice given by our system to help you stay healthy. However, your care team may have specific advice just for you. Please talk to your care team about your preventive care needs.  Nutrition  Eat 5 or more servings of fruits and vegetables each day.  Try wheat bread, brown rice and whole grain pasta (instead of white bread, rice, and pasta).  Get enough calcium and vitamin D. Check the label on foods and aim for  100% of the RDA (recommended daily allowance).  Lifestyle  Exercise at least 150 minutes each week  (30 minutes a day, 5 days a week).  Do muscle strengthening activities 2 days a week. These help control your weight and prevent disease.  No smoking.  Wear sunscreen to prevent skin cancer.  Have a dental exam and cleaning every 6 months.  Yearly exams  See your health care team every year to talk about:  Any changes in your health.  Any medicines your care team has prescribed.  Preventive care, family planning, and ways to prevent chronic diseases.  Shots (vaccines)   HPV shots (up to age 26), if you've never had them before.  Hepatitis B shots (up to age 59), if you've never had them before.  COVID-19 shot: Get this shot when it's due.  Flu shot: Get a flu shot every year.  Tetanus shot: Get a tetanus shot every 10 years.  Pneumococcal, hepatitis A, and RSV shots: Ask your care team if you need these based on your risk.  Shingles shot (for age 50 and up)  General health tests  Diabetes screening:  Starting at age 35, Get screened for diabetes at least every 3 years.  If you are younger than age 35, ask your care team if you should be screened for diabetes.  Cholesterol test: At age 39, start having a cholesterol test every 5 years, or more often if advised.  Bone density scan (DEXA): At age 50, ask your care team if you should have this scan for osteoporosis (brittle bones).  Hepatitis C: Get tested at least once in your life.  STIs (sexually transmitted infections)  Before age 24: Ask your care team if you should be screened for STIs.  After age 24: Get screened for STIs if you're at risk. You are at risk for STIs (including HIV) if:  You are sexually active with more than one person.  You don't use condoms every time.  You or a partner was diagnosed with a sexually transmitted infection.  If you are at risk for HIV, ask about PrEP medicine to prevent HIV.  Get tested for HIV at least once in your life, whether  you are at risk for HIV or not.  Cancer screening tests  Cervical cancer screening: If you have a cervix, begin getting regular cervical cancer screening tests starting at age 21.  Breast cancer scan (mammogram): If you've ever had breasts, begin having regular mammograms starting at age 40. This is a scan to check for breast cancer.  Colon cancer screening: It is important to start screening for colon cancer at age 45.  Have a colonoscopy test every 10 years (or more often if you're at risk) Or, ask your provider about stool tests like a FIT test every year or Cologuard test every 3 years.  To learn more about your testing options, visit:   .  For help making a decision, visit:   https://bit.ly/vt12779.  Prostate cancer screening test: If you have a prostate, ask your care team if a prostate cancer screening test (PSA) at age 55 is right for you.  Lung cancer screening: If you are a current or former smoker ages 50 to 80, ask your care team if ongoing lung cancer screenings are right for you.  For informational purposes only. Not to replace the advice of your health care provider. Copyright   2023 [employer]. All rights reserved. Clinically reviewed by the [facility] Transitions Program. [employer] [id number] - REV 01/24.  Learning About Stress  What is stress?     Stress is your body's response to a hard situation. Your body can have a physical, emotional, or mental response. Stress is a fact of life for most people, and it affects everyone differently. What causes stress for you may not be stressful for someone else.  A lot of things can cause stress. You may feel stress when you go on a job interview, take a test, or run a race. This kind of short-term stress is normal and even useful. It can help you if you need to work hard or react quickly. For example, stress can help you finish an important job on time.  Long-term stress is caused by ongoing stressful situations or events. Examples of  long-term stress include long-term health problems, ongoing problems at work, or conflicts in your family. Long-term stress can harm your health.  How does stress affect your health?  When you are stressed, your body responds as though you are in danger. It makes hormones that speed up your heart, make you breathe faster, and give you a burst of energy. This is called the fight-or-flight stress response. If the stress is over quickly, your body goes back to normal and no harm is done.  But if stress happens too often or lasts too long, it can have bad effects. Long-term stress can make you more likely to get sick, and it can make symptoms of some diseases worse. If you tense up when you are stressed, you may develop neck, shoulder, or low back pain. Stress is linked to high blood pressure and heart disease.  Stress also harms your emotional health. It can make you hernández, tense, or depressed. Your relationships may suffer, and you may not do well at work or school.  What can you do to manage stress?  You can try these things to help manage stress:   Do something active. Exercise or activity can help reduce stress. Walking is a great way to get started. Even everyday activities such as housecleaning or yard work can help.  Try yoga or pravin chi. These techniques combine exercise and meditation. You may need some training at first to learn them.  Do something you enjoy. For example, listen to music or go to a movie. Practice your hobby or do volunteer work.  Meditate. This can help you relax, because you are not worrying about what happened before or what may happen in the future.  Do guided imagery. Imagine yourself in any setting that helps you feel calm. You can use online videos, books, or a teacher to guide you.  Do breathing exercises. For example:  From a standing position, bend forward from the waist with your knees slightly bent. Let your arms dangle close to the floor.  Breathe in slowly and deeply as you  "return to a standing position. Roll up slowly and lift your head last.  Hold your breath for just a few seconds in the standing position.  Breathe out slowly and bend forward from the waist.  Let your feelings out. Talk, laugh, cry, and express anger when you need to. Talking with supportive friends or family, a counselor, or a rebekah leader about your feelings is a healthy way to relieve stress. Avoid discussing your feelings with people who make you feel worse.  Write. It may help to write about things that are bothering you. This helps you find out how much stress you feel and what is causing it. When you know this, you can find better ways to cope.  What can you do to prevent stress?  You might try some of these things to help prevent stress:  Manage your time. This helps you find time to do the things you want and need to do.  Get enough sleep. Your body recovers from the stresses of the day while you are sleeping.  Get support. Your family, friends, and community can make a difference in how you experience stress.  Limit your news feed. Avoid or limit time on social media or news that may make you feel stressed.  Do something active. Exercise or activity can help reduce stress. Walking is a great way to get started.  Where can you learn more?  Go to https://www.[employer].net/patiented  Enter N032 in the search box to learn more about \"Learning About Stress.\"  Current as of: October 24, 2024  Content Version: 14.5 2024-2025 [employer].   Care instructions adapted under license by your healthcare professional. If you have questions about a medical condition or this instruction, always ask your healthcare professional. [employer] disclaims any warranty or liability for your use of this information.       Patient Education   Preventive Care Advice   This is general advice given by our system to help you stay healthy. However, your care team may have specific advice just for you. Please " talk to your care team about your preventive care needs.  Nutrition  Eat 5 or more servings of fruits and vegetables each day.  Try wheat bread, brown rice and whole grain pasta (instead of white bread, rice, and pasta).  Get enough calcium and vitamin D. Check the label on foods and aim for 100% of the RDA (recommended daily allowance).  Lifestyle  Exercise at least 150 minutes each week  (30 minutes a day, 5 days a week).  Do muscle strengthening activities 2 days a week. These help control your weight and prevent disease.  No smoking.  Wear sunscreen to prevent skin cancer.  Have a dental exam and cleaning every 6 months.  Yearly exams  See your health care team every year to talk about:  Any changes in your health.  Any medicines your care team has prescribed.  Preventive care, family planning, and ways to prevent chronic diseases.  Shots (vaccines)   HPV shots (up to age 26), if you've never had them before.  Hepatitis B shots (up to age 59), if you've never had them before.  COVID-19 shot: Get this shot when it's due.  Flu shot: Get a flu shot every year.  Tetanus shot: Get a tetanus shot every 10 years.  Pneumococcal, hepatitis A, and RSV shots: Ask your care team if you need these based on your risk.  Shingles shot (for age 50 and up)  General health tests  Diabetes screening:  Starting at age 35, Get screened for diabetes at least every 3 years.  If you are younger than age 35, ask your care team if you should be screened for diabetes.  Cholesterol test: At age 39, start having a cholesterol test every 5 years, or more often if advised.  Bone density scan (DEXA): At age 50, ask your care team if you should have this scan for osteoporosis (brittle bones).  Hepatitis C: Get tested at least once in your life.  STIs (sexually transmitted infections)  Before age 24: Ask your care team if you should be screened for STIs.  After age 24: Get screened for STIs if you're at risk. You are at risk for STIs (including  HIV) if:  You are sexually active with more than one person.  You don't use condoms every time.  You or a partner was diagnosed with a sexually transmitted infection.  If you are at risk for HIV, ask about PrEP medicine to prevent HIV.  Get tested for HIV at least once in your life, whether you are at risk for HIV or not.  Cancer screening tests  Cervical cancer screening: If you have a cervix, begin getting regular cervical cancer screening tests starting at age 21.  Breast cancer scan (mammogram): If you've ever had breasts, begin having regular mammograms starting at age 40. This is a scan to check for breast cancer.  Colon cancer screening: It is important to start screening for colon cancer at age 45.  Have a colonoscopy test every 10 years (or more often if you're at risk) Or, ask your provider about stool tests like a FIT test every year or Cologuard test every 3 years.  To learn more about your testing options, visit:   .  For help making a decision, visit:   https://bit.ly/bu20609.  Prostate cancer screening test: If you have a prostate, ask your care team if a prostate cancer screening test (PSA) at age 55 is right for you.  Lung cancer screening: If you are a current or former smoker ages 50 to 80, ask your care team if ongoing lung cancer screenings are right for you.  For informational purposes only. Not to replace the advice of your health care provider. Copyright   2023 [facility]. All rights reserved. Clinically reviewed by the [facility] Transitions Program. [employer] [id number] - REV 01/24.  Learning About Stress  What is stress?     Stress is your body's response to a hard situation. Your body can have a physical, emotional, or mental response. Stress is a fact of life for most people, and it affects everyone differently. What causes stress for you may not be stressful for someone else.  A lot of things can cause stress. You may feel stress when you go on a job interview, take a  test, or run a race. This kind of short-term stress is normal and even useful. It can help you if you need to work hard or react quickly. For example, stress can help you finish an important job on time.  Long-term stress is caused by ongoing stressful situations or events. Examples of long-term stress include long-term health problems, ongoing problems at work, or conflicts in your family. Long-term stress can harm your health.  How does stress affect your health?  When you are stressed, your body responds as though you are in danger. It makes hormones that speed up your heart, make you breathe faster, and give you a burst of energy. This is called the fight-or-flight stress response. If the stress is over quickly, your body goes back to normal and no harm is done.  But if stress happens too often or lasts too long, it can have bad effects. Long-term stress can make you more likely to get sick, and it can make symptoms of some diseases worse. If you tense up when you are stressed, you may develop neck, shoulder, or low back pain. Stress is linked to high blood pressure and heart disease.  Stress also harms your emotional health. It can make you hernández, tense, or depressed. Your relationships may suffer, and you may not do well at work or school.  What can you do to manage stress?  You can try these things to help manage stress:   Do something active. Exercise or activity can help reduce stress. Walking is a great way to get started. Even everyday activities such as housecleaning or yard work can help.  Try yoga or pravin chi. These techniques combine exercise and meditation. You may need some training at first to learn them.  Do something you enjoy. For example, listen to music or go to a movie. Practice your hobby or do volunteer work.  Meditate. This can help you relax, because you are not worrying about what happened before or what may happen in the future.  Do guided imagery. Imagine yourself in any setting that  "helps you feel calm. You can use online videos, books, or a teacher to guide you.  Do breathing exercises. For example:  From a standing position, bend forward from the waist with your knees slightly bent. Let your arms dangle close to the floor.  Breathe in slowly and deeply as you return to a standing position. Roll up slowly and lift your head last.  Hold your breath for just a few seconds in the standing position.  Breathe out slowly and bend forward from the waist.  Let your feelings out. Talk, laugh, cry, and express anger when you need to. Talking with supportive friends or family, a counselor, or a rebekah leader about your feelings is a healthy way to relieve stress. Avoid discussing your feelings with people who make you feel worse.  Write. It may help to write about things that are bothering you. This helps you find out how much stress you feel and what is causing it. When you know this, you can find better ways to cope.  What can you do to prevent stress?  You might try some of these things to help prevent stress:  Manage your time. This helps you find time to do the things you want and need to do.  Get enough sleep. Your body recovers from the stresses of the day while you are sleeping.  Get support. Your family, friends, and community can make a difference in how you experience stress.  Limit your news feed. Avoid or limit time on social media or news that may make you feel stressed.  Do something active. Exercise or activity can help reduce stress. Walking is a great way to get started.  Where can you learn more?  Go to https://www.[employer].net/patiented  Enter N032 in the search box to learn more about \"Learning About Stress.\"  Current as of: October 24, 2024  Content Version: 14.5    [id number] [employer].   Care instructions adapted under license by your healthcare professional. If you have questions about a medical condition or this instruction, always ask your healthcare professional. " [employer], [facility] disclaims any warranty or liability for your use of this information.

## 2025-06-25 NOTE — PROGRESS NOTES
Preventive Care Visit  [facility] NONA Shah MD, Family Medicine  Jun 25, 2025      Assessment & Plan     Routine general medical examination at a health care facility  - REVIEW OF HEALTH MAINTENANCE PROTOCOL ORDERS  - PRIMARY CARE FOLLOW-UP SCHEDULING  - Lipid panel reflex to direct LDL Non-fasting  - Lipid panel reflex to direct LDL Non-fasting    Screening for cardiovascular condition  -check lipid panel today     Hyperlipidemia LDL goal <100  -await labs   - Lipid panel reflex to direct LDL Non-fasting  - Comprehensive metabolic panel    The 10-year ASCVD risk score (Jennifer HERNANDEZ, et al., 2019) is: 3%    Values used to calculate the score:      Age: 55 years      Sex: Female      Is Non- : No      Diabetic: No      Tobacco smoker: No      Systolic Blood Pressure: 137 mmHg      Is BP treated: Yes      HDL Cholesterol: 64 mg/dL      Total Cholesterol: 223 mg/dL      Cervical cancer screening  -screening guidelines reviewed   - HPV and Gynecologic Cytology Panel - Recommended Age 30 - 65 Years    S/P gastric bypass  -labs checked 2/25   - Comprehensive metabolic panel  - Comprehensive metabolic panel    TERRANCE (generalized anxiety disorder)  -stable on medication, refills provided   - FLUoxetine (PROZAC) 40 MG capsule  Dispense: 90 capsule; Refill: 3  - propranolol (INDERAL) 20 MG tablet  Dispense: 90 tablet; Refill: 3    Benign essential hypertension  -patient will update via My Chart   - Albumin Random Urine Quantitative with Creat Ratio  - Comprehensive metabolic panel  - Hemoglobin A1c  - Albumin Random Urine Quantitative with Creat Ratio  - Comprehensive metabolic panel  - Hemoglobin A1c  - losartan-hydrochlorothiazide (HYZAAR) 100-25 MG tablet  Dispense: 90 tablet; Refill: 3    Chronic migraine without aura without status migrainosus, not intractable  -stable   - propranolol (INDERAL) 20 MG tablet  Dispense: 90 tablet; Refill: 3  - SUMAtriptan (IMITREX) 25 MG  "tablet  Dispense: 18 tablet; Refill: 1    Vaginal discharge  - Chlamydia trachomatis PCR  - Neisseria gonorrhoeae PCR  - Wet prep - Clinic Collect    Screening examination for STI  -- Hepatitis B surface antigen  - Treponema Abs w Reflex to RPR and Titer  - HIV Antigen Antibody Combo Cascade  - Hepatitis C Screen Reflex to HCV RNA Quant and Genotype  - Chlamydia trachomatis PCR  - Neisseria gonorrhoeae PCR    External hemorrhoids  -high fiber diet  -sitz baths   - hydrocortisone, Perianal, (HYDROCORTISONE) 2.5 % cream  Dispense: 30 g; Refill: 0    Encounter for immunization  - Pneumococcal 20 Valent Conjugate (PCV20)    Tremor  - propranolol (INDERAL) 20 MG tablet  Dispense: 90 tablet; Refill: 3          BMI  Estimated body mass index is 29.09 kg/m  as calculated from the following:    Height as of this encounter: 1.629 m (5' 4.13\").    Weight as of this encounter: 77.2 kg (170 lb 3.2 oz).   Weight management plan: Discussed healthy diet and exercise guidelines  Reviewed preventive health counseling, as reflected in patient instructions       Regular exercise       Healthy diet/nutrition  Counseling  Appropriate preventive services were addressed with this patient via screening, questionnaire, or discussion as appropriate for fall prevention, nutrition, physical activity, Tobacco-use cessation, social engagement, weight loss and cognition.  Checklist reviewing preventive services available has been given to the patient.  Reviewed patient's diet, addressing concerns and/or questions.   She is at risk for psychosocial distress and has been provided with information to reduce risk.         Follow-up    Follow-up Visit   Expected date:  Jun 25, 2026 (Approximate)      Follow Up Appointment Details:     Follow-up with whom?: PCP    Follow-Up for what?: Adult Preventive    How?: In Person             Follow-up Visit   Expected date:  Jun 25, 2026 (Approximate)      Follow Up Appointment Details:     Follow-up with whom?: " PCP    Follow-Up for what?: Adult Preventive    How?: In Person                 Dayanara Ramesh is a 55 year old, presenting for the following:  Physical        6/25/2025     2:41 PM   Additional Questions   Roomed by esau        Wt Readings from Last 2 Encounters:   06/25/25 77.2 kg (170 lb 3.2 oz)   02/19/25 73.5 kg (162 lb)        Mammogram and colonoscopy up to date       Saw GI for chronic diarrhea  History of gastric bypass    Has had some vaginal discharge+  -clear  -1-2 weeks  -slight itching  -no bleeding  -no concern for STI      Hypertension Follow-up     Do you check your blood pressure regularly outside of the clinic? No   Are you following a low salt diet? Yes  Are your blood pressures ever more than 140 on the top number (systolic) OR more       than 90 on the bottom number (diastolic), for example 140/90? No       No chest pain  No ankle edema  No dizziness    Depression and Anxiety   How are you doing with your depression since your last visit? Improved   How are you doing with your anxiety since your last visit?  No change  Are you having other symptoms that might be associated with depression or anxiety? Yes:  See last PHQ-9 and TERRANCE-7  Have you had a significant life event? No        Do you have any concerns with your use of alcohol or other drugs? No  No thoughts of self harm  Sleep is OK   Some stress with work.    Migraines:  -stable  -no changes in the nature of the headaches     Small hemorrhoid+  No bleeding  3-4 days      HPI    Advance Care Planning    Discussed advance care planning with patient; informed AVS has link to [facility].        6/25/2025   General Health   How would you rate your overall physical health? Excellent   Feel stress (tense, anxious, or unable to sleep) To some extent   (!) STRESS CONCERN      6/25/2025   Nutrition   Three or more servings of calcium each day? Yes   Diet: Low fat/cholesterol   How many servings of fruit and vegetables per day? 4 or more    How many sweetened beverages each day? 0-1         6/25/2025   Exercise   Days per week of moderate/strenous exercise 4 days   Average minutes spent exercising at this level 60 min         6/25/2025   Social Factors   Frequency of gathering with friends or relatives Twice a week   Worry food won't last until get money to buy more No   Food not last or not have enough money for food? No   Do you have housing? (Housing is defined as stable permanent housing and does not include staying outside in a car, in a tent, in an abandoned building, in an overnight shelter, or couch-surfing.) Yes   Are you worried about losing your housing? No   Lack of transportation? No   Unable to get utilities (heat,electricity)? No         6/25/2025   Fall Risk   Fallen 2 or more times in the past year? No   Trouble with walking or balance? No          6/25/2025   Dental   Dentist two times every year? Yes           Today's PHQ-2 Score:       2/17/2025     8:49 AM   PHQ-2 ( 1999 [employer])   Q1: Little interest or pleasure in doing things 0   Q2: Feeling down, depressed or hopeless 0   PHQ-2 Score 0         6/25/2025   Substance Use   Alcohol more than 3/day or more than 7/wk Not Applicable   Do you use any other substances recreationally? No     Social History     Tobacco Use    Smoking status: Never    Smokeless tobacco: Never   Vaping Use    Vaping status: Never Used   Substance Use Topics    Alcohol use: No    Drug use: No           6/14/2024   LAST FHS-7 RESULTS   1st degree relative breast or ovarian cancer No   Any relative bilateral breast cancer No   Any male have breast cancer No   Any ONE woman have BOTH breast AND ovarian cancer No   Any woman with breast cancer before 50yrs No   2 or more relatives with breast AND/OR ovarian cancer No   2 or more relatives with breast AND/OR bowel cancer No        Mammogram Screening - Mammogram every 1-2 years updated in Health Maintenance based on mutual decision making        6/25/2025   STI  Screening   New sexual partner(s) since last STI/HIV test? No     History of abnormal Pap smear: No - age 30- 64 PAP with HPV every 5 years recommended        Latest Ref Rng & Units 5/5/2022     4:22 PM 12/19/2016     4:17 PM 12/19/2016    12:00 AM   PAP / HPV   PAP (Historical)    NIL    HPV 16 DNA NEG  Negative     HPV 18 DNA NEG  Negative     Other HR HPV NEG  Negative     PAP-ABSTRACT  See Scanned Document        ASCVD Risk   The 10-year ASCVD risk score (Jennifer HERNANDEZ, et al., 2019) is: 3%    Values used to calculate the score:      Age: 55 years      Sex: Female      Is Non- : No      Diabetic: No      Tobacco smoker: No      Systolic Blood Pressure: 137 mmHg      Is BP treated: Yes      HDL Cholesterol: 64 mg/dL      Total Cholesterol: 223 mg/dL         Reviewed and updated as needed this visit by Provider   Tobacco  Allergies  Meds  Problems  Med Hx  Surg Hx  Fam Hx            Past Medical History:   Diagnosis Date    Diabetes mellitus     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    Fatigue 01/27/2015    Hypertension     Lateral epicondylitis 11/21/2013    Sleep apnea     uses cpap    Surgical complications      Past Surgical History:   Procedure Laterality Date    ABDOMINOPLASTY  12/16/2022    COLONOSCOPY N/A 02/01/2024    Procedure: COLONOSCOPY, WITH POLYPECTOMY AND BIOPSY;  Surgeon: Josephine Quijano MD;  Location:  OR    COLONOSCOPY WITH CO2 INSUFFLATION N/A 02/01/2024    Procedure: Colonoscopy with CO2 insufflation;  Surgeon: Josephine Quijano MD;  Location:  OR    FOOT SURGERY      HERNIA REPAIR      LAPAROSCOPIC BYPASS GASTRIC N/A 05/28/2015    Procedure: LAPAROSCOPIC BYPASS GASTRIC;  Surgeon: Eulalio Loza MD;  Location:  OR    [facility] Right     LIVER BIOPSY      [facility] STOMACH SURGERY PROCEDURE UNLISTED       Lab work is in process  Labs reviewed in EPIC  BP Readings from Last 3 Encounters:   06/25/25 137/80   02/19/25 123/85   06/12/24 126/83    Wt Readings from  Last 3 Encounters:   06/25/25 77.2 kg (170 lb 3.2 oz)   02/19/25 73.5 kg (162 lb)   02/17/25 71.7 kg (158 lb)                  Patient Active Problem List   Diagnosis    TERRANCE (generalized anxiety disorder)    S/P epigastric hernia repair    Benign essential hypertension    Migraine    Hypokalemia    Recurrent genital herpes    Snoring    Fatigue    Encounter for Essure implantation    Stress incontinence    Plantar fasciitis    Bariatric surgery status    Hx of LASIK    No diabetic retinopathy in both eyes    S/P gastric bypass    KANDIS (obstructive sleep apnea)    Hypertriglyceridemia    Knee pain, unspecified laterality    Stress    Tendinitis of right wrist    Overweight    Postsurgical malabsorption    H/O diabetes mellitus    Hyperlipidemia LDL goal <100    History of hypertension    Dizziness    Tremor    Dumping syndrome    Chronic diarrhea     Past Surgical History:   Procedure Laterality Date    ABDOMINOPLASTY  12/16/2022    COLONOSCOPY N/A 02/01/2024    Procedure: COLONOSCOPY, WITH POLYPECTOMY AND BIOPSY;  Surgeon: Josephine Quijano MD;  Location: [facility] OR    COLONOSCOPY WITH CO2 INSUFFLATION N/A 02/01/2024    Procedure: Colonoscopy with CO2 insufflation;  Surgeon: Josephine Quijano MD;  Location: [facility] OR    FOOT SURGERY      HERNIA REPAIR      LAPAROSCOPIC BYPASS GASTRIC N/A 05/28/2015    Procedure: LAPAROSCOPIC BYPASS GASTRIC;  Surgeon: Eulalio Loza MD;  Location: [facility] OR    LASIK Right     LIVER BIOPSY      [facility] STOMACH SURGERY PROCEDURE UNLISTED         Social History     Tobacco Use    Smoking status: Never    Smokeless tobacco: Never   Substance Use Topics    Alcohol use: No     Family History   Problem Relation Age of Onset    Hypertension Maternal Grandmother     Cancer Sister         liver cancer    Thyroid Disease Sister     Thyroid Disease Mother     Cerebrovascular Disease Paternal Uncle     Glaucoma No family hx of     Macular Degeneration No family hx of     Diabetes Paternal Grandmother      Diabetes Other          Current Outpatient Medications   Medication Sig Dispense Refill    Acetaminophen (TYLENOL PO) Take 325 mg by mouth every 6 hours as needed for mild pain or fever      Ascorbic Acid (VITAMIN C PO) Take 500 mg by mouth every morning       Calcium Carbonate-Vitamin D (CALCIUM + D PO) Take 600 mg by mouth 2 times daily (with meals)      Cyanocobalamin (B-12 PO) Take 1 drop by mouth every morning 1000 mcg per drop      Ferrous Sulfate (IRON SUPPLEMENT PO) Take 65 mg by mouth every morning      FLUoxetine (PROZAC) 40 MG capsule Take 1 capsule (40 mg) by mouth daily. Profile Rx 90 capsule 3    hydrocortisone, Perianal, (HYDROCORTISONE) 2.5 % cream Place rectally 2 times daily as needed for hemorrhoids. 30 g 0    losartan-hydrochlorothiazide (HYZAAR) 100-25 MG tablet Take 1 tablet by mouth every morning. 90 tablet 3    Multiple Vitamins-Minerals (MULTIVITAMIN PO) Take 2 tablets by mouth every morning       order for DME Equipment being ordered: right wrist splint- 1 Units 0    phentermine 15 MG capsule Take 1 capsule (15 mg) by mouth every morning. 90 capsule 0    propranolol (INDERAL) 20 MG tablet Take 1 tablet (20 mg) by mouth nightly as needed (tremor, anxiety,). 90 tablet 3    SUMAtriptan (IMITREX) 25 MG tablet Take 1 tablet (25 mg) by mouth at onset of headache for migraine. May repeat in 2 hours. Max 8 tablets/24 hours. 18 tablet 1    valACYclovir (VALTREX) 500 MG tablet Take 1 tablet (500 mg) by mouth daily 90 tablet 3    Vitamin D, Cholecalciferol, 25 MCG (1000 UT) CAPS Take 2,000 Units by mouth every morning       Allergies   Allergen Reactions    Succinylcholine Other (See Comments)     Unable to wake after ankle surgery    Vicodin [Hydrocodone-Acetaminophen] Hives    Lisinopril      Dry cough    Penicillins      Unknown reaction in childhood         Review of Systems  Constitutional, HEENT, cardiovascular, pulmonary, gi and gu systems are negative, except as otherwise noted.     Objective   "  Exam  /80   Pulse 59   Temp 97.4  F (36.3  C) (Temporal)   Resp 14   Ht 1.629 m (5' 4.13\")   Wt 77.2 kg (170 lb 3.2 oz)   LMP 05/15/2023 (Approximate)   SpO2 100%   BMI 29.09 kg/m     Estimated body mass index is 29.09 kg/m  as calculated from the following:    Height as of this encounter: 1.629 m (5' 4.13\").    Weight as of this encounter: 77.2 kg (170 lb 3.2 oz).    Physical Exam  GENERAL: alert and no distress  EYES: Eyes grossly normal to inspection, PERRL and conjunctivae and sclerae normal  RESP: lungs clear to auscultation - no rales, rhonchi or wheezes  CV: regular rate and rhythm, normal S1 S2, no S3 or S4, no murmur, click or rub, no peripheral edema  ABDOMEN: soft, nontender, no hepatosplenomegaly, no masses and bowel sounds normal   (female): normal female external genitalia, normal urethral meatus, normal vaginal mucosa, and normal cervix, small amount of white discharge   MS: no gross musculoskeletal defects noted, no edema  SKIN: no suspicious lesions or rashes  NEURO: Normal strength and tone, mentation intact and speech normal  PSYCH: mentation appears normal, affect normal/bright    Rectal: small external hemorrhoid at 12 o'clock position     Signed Electronically by: Marya Shah MD    "

## 2025-06-26 ENCOUNTER — RESULTS FOLLOW-UP (OUTPATIENT)
Dept: FAMILY MEDICINE | Facility: CLINIC | Age: 55
End: 2025-06-26

## 2025-06-26 DIAGNOSIS — B37.31 CANDIDIASIS OF VAGINA: Primary | ICD-10-CM

## 2025-06-26 LAB
ALBUMIN SERPL BCG-MCNC: 4.1 G/DL (ref 3.5–5.2)
ALP SERPL-CCNC: 121 U/L (ref 40–150)
ALT SERPL W P-5'-P-CCNC: 43 U/L (ref 0–50)
ANION GAP SERPL CALCULATED.3IONS-SCNC: 11 MMOL/L (ref 7–15)
AST SERPL W P-5'-P-CCNC: 37 U/L (ref 0–45)
BILIRUB SERPL-MCNC: 0.2 MG/DL
BUN SERPL-MCNC: 13.5 MG/DL (ref 6–20)
C TRACH DNA SPEC QL NAA+PROBE: NEGATIVE
CALCIUM SERPL-MCNC: 8.9 MG/DL (ref 8.8–10.4)
CHLORIDE SERPL-SCNC: 102 MMOL/L (ref 98–107)
CHOLEST SERPL-MCNC: 209 MG/DL
CREAT SERPL-MCNC: 0.65 MG/DL (ref 0.51–0.95)
CREAT UR-MCNC: 65.2 MG/DL
EGFRCR SERPLBLD CKD-EPI 2021: >90 ML/MIN/1.73M2
FASTING STATUS PATIENT QL REPORTED: NO
FASTING STATUS PATIENT QL REPORTED: NO
GLUCOSE SERPL-MCNC: 89 MG/DL (ref 70–99)
HBV SURFACE AG SERPL QL IA: NONREACTIVE
HCO3 SERPL-SCNC: 26 MMOL/L (ref 22–29)
HCV AB SERPL QL IA: NONREACTIVE
HDLC SERPL-MCNC: 66 MG/DL
HIV 1+2 AB+HIV1 P24 AG SERPL QL IA: NONREACTIVE
HPV HR 12 DNA CVX QL NAA+PROBE: NEGATIVE
HPV16 DNA CVX QL NAA+PROBE: NEGATIVE
HPV18 DNA CVX QL NAA+PROBE: NEGATIVE
HUMAN PAPILLOMA VIRUS FINAL DIAGNOSIS: NORMAL
LDLC SERPL CALC-MCNC: 123 MG/DL
MICROALBUMIN UR-MCNC: <12 MG/L
MICROALBUMIN/CREAT UR: NORMAL MG/G{CREAT}
N GONORRHOEA DNA SPEC QL NAA+PROBE: NEGATIVE
NONHDLC SERPL-MCNC: 143 MG/DL
POTASSIUM SERPL-SCNC: 3.5 MMOL/L (ref 3.4–5.3)
PROT SERPL-MCNC: 6.7 G/DL (ref 6.4–8.3)
SODIUM SERPL-SCNC: 139 MMOL/L (ref 135–145)
SPECIMEN TYPE: NORMAL
SPECIMEN TYPE: NORMAL
T PALLIDUM AB SER QL: NONREACTIVE
TRIGL SERPL-MCNC: 101 MG/DL

## 2025-06-26 RX ORDER — FLUCONAZOLE 150 MG/1
150 TABLET ORAL ONCE
Qty: 1 TABLET | Refills: 0 | Status: SHIPPED | OUTPATIENT
Start: 2025-06-26 | End: 2025-06-26

## 2025-06-30 LAB
BKR AP ASSOCIATED HPV REPORT: NORMAL
BKR LAB AP GYN ADEQUACY: NORMAL
BKR LAB AP GYN INTERPRETATION: NORMAL
BKR LAB AP PREVIOUS ABNORMAL: NORMAL
PATH REPORT.COMMENTS IMP SPEC: NORMAL
PATH REPORT.COMMENTS IMP SPEC: NORMAL
PATH REPORT.RELEVANT HX SPEC: NORMAL

## 2025-07-22 ENCOUNTER — OFFICE VISIT (OUTPATIENT)
Dept: OPTOMETRY | Facility: CLINIC | Age: 55
End: 2025-07-22
Payer: COMMERCIAL

## 2025-07-22 DIAGNOSIS — Z01.00 EXAMINATION OF EYES AND VISION: Primary | ICD-10-CM

## 2025-07-22 DIAGNOSIS — H52.223 REGULAR ASTIGMATISM OF BOTH EYES: ICD-10-CM

## 2025-07-22 DIAGNOSIS — Z98.890 HX OF LASIK: ICD-10-CM

## 2025-07-22 DIAGNOSIS — Z86.39 H/O DIABETES MELLITUS: ICD-10-CM

## 2025-07-22 DIAGNOSIS — H04.123 DRY EYE SYNDROME OF BOTH EYES: ICD-10-CM

## 2025-07-22 DIAGNOSIS — H52.4 PRESBYOPIA: ICD-10-CM

## 2025-07-22 DIAGNOSIS — H52.13 MYOPIA OF BOTH EYES: ICD-10-CM

## 2025-07-22 PROCEDURE — 92014 COMPRE OPH EXAM EST PT 1/>: CPT | Performed by: OPTOMETRIST

## 2025-07-22 PROCEDURE — 92015 DETERMINE REFRACTIVE STATE: CPT | Performed by: OPTOMETRIST

## 2025-07-22 RX ORDER — LIFITEGRAST 50 MG/ML
1 SOLUTION/ DROPS OPHTHALMIC 2 TIMES DAILY
Qty: 60 EACH | Refills: 1 | Status: SHIPPED | OUTPATIENT
Start: 2025-07-22

## 2025-07-22 ASSESSMENT — REFRACTION_WEARINGRX
OD_CYLINDER: +0.75
OS_ADD: +2.25
OS_SPHERE: -2.00
OS_AXIS: 025
OS_CYLINDER: +0.50
OD_SPHERE: -1.25
SPECS_TYPE: PAL
OD_AXIS: 178
OD_ADD: +2.25

## 2025-07-22 ASSESSMENT — REFRACTION_MANIFEST
OS_ADD: +2.25
OS_SPHERE: -2.00
OS_AXIS: 025
OD_ADD: +2.25
OD_CYLINDER: +0.75
OD_SPHERE: -1.25
OD_AXIS: 178
OS_CYLINDER: +0.75

## 2025-07-22 ASSESSMENT — CONF VISUAL FIELD
OD_SUPERIOR_NASAL_RESTRICTION: 0
OD_INFERIOR_NASAL_RESTRICTION: 0
OS_SUPERIOR_TEMPORAL_RESTRICTION: 0
OS_INFERIOR_NASAL_RESTRICTION: 0
OD_NORMAL: 1
OD_SUPERIOR_TEMPORAL_RESTRICTION: 0
OS_NORMAL: 1
OS_SUPERIOR_NASAL_RESTRICTION: 0
OD_INFERIOR_TEMPORAL_RESTRICTION: 0
OS_INFERIOR_TEMPORAL_RESTRICTION: 0

## 2025-07-22 ASSESSMENT — VISUAL ACUITY
OD_CC: 20/20
OS_CC+: -2
OD_SC+: -2
OS_SC: 20/100
OS_CC: 20/30
OD_SC: 20/30
METHOD: SNELLEN - LINEAR
OS_CC: 20/30
OD_CC: 20/30
CORRECTION_TYPE: GLASSES

## 2025-07-22 ASSESSMENT — TONOMETRY
IOP_METHOD: ICARE
OD_IOP_MMHG: 18.4
OS_IOP_MMHG: 20.5

## 2025-07-22 ASSESSMENT — EXTERNAL EXAM - RIGHT EYE: OD_EXAM: NORMAL

## 2025-07-22 ASSESSMENT — EXTERNAL EXAM - LEFT EYE: OS_EXAM: NORMAL

## 2025-07-22 ASSESSMENT — KERATOMETRY
OD_AXISANGLE2_DEGREES: 003
OS_AXISANGLE_DEGREES: 067
OD_K1POWER_DIOPTERS: 44.75
OS_AXISANGLE2_DEGREES: 157
OS_K1POWER_DIOPTERS: 45.50
OS_K2POWER_DIOPTERS: 46.50
OD_K2POWER_DIOPTERS: 45.50
OD_AXISANGLE_DEGREES: 093

## 2025-07-22 ASSESSMENT — SLIT LAMP EXAM - LIDS: COMMENTS: MEIBOMIAN GLAND DYSFUNCTION

## 2025-07-22 ASSESSMENT — CUP TO DISC RATIO
OS_RATIO: 0.2
OD_RATIO: 0.3

## 2025-07-22 NOTE — PATIENT INSTRUCTIONS
Xiidra- (Liftegras ophthalmic solution 5 %) 1 drop both eyes 2 x day.    Non preserved artificial tears- 1 drop both eyes at least 2 x day.  Lunchtime and dinnertime.    Ocusoft Hypochlor- spray solution onto cotton pad.  Close eyes and gently apply to eyelids and eyelashes using side to side motion.  Use morning and evening.    There are not any signs of the diabetes affecting the eyes today.  It is important that you get your eyes dilated once yearly and keep good control of your diabetes.    Eyeglass prescription given.    Return in 1 year for a complete eye exam or sooner if needed.    Brayan Schultz, OD    The affects of the dilating drops last for 4- 6 hours.  You will be more sensitive to light and vision will be blurry up close.  Do not drive if you do not feel comfortable.  Mydriatic sunglasses were given if needed.    Patient Education   Diabetes weakens the blood vessels all over the body, including the eyes. Damage to the blood vessels in the eyes can cause swelling or bleeding into part of the eye (called the retina). This is called diabetic retinopathy (JAME-tin--[facility]-thee). If not treated, this disease can cause vision loss or blindness.   Symptoms may include blurred or distorted vision, but many people have no symptoms. It's important to see your eye doctor regularly to check for problems.   Early treatment and good control can help protect your vision. Here are the things you can do to help prevent vision loss:      1. Keep your blood sugar levels under tight control.      2. Bring high blood pressure under control.      3. No smoking.      4. Have yearly dilated eye exams.       Optometry Providers       Clinic Locations                                 Telephone Number   Dr. Bonnie Barber   [facility] [place]  [phone]     [facility] Hours:                [facility] Hours:       [facility] Hours:   [address]   [address]     [address]  [place] [address]   [place] [address]    [place] [address]  Phone: [phone]                    Phone: [phone]     Phone: [phone]                      Monday 8:00-6:00                          Monday 8:00-6:00                          Monday 8:00-6:00              Tuesday 8:00-6:00                          Tuesday 8:00-6:00                          Tuesday 8:00-6:00              Wednesday 8:00-6:00                  Wednesday 8:00-6:00                   Wednesday 8:00-6:00      Thursday 8:00-6:00                        Thursday 8:00-6:00                         Thursday 8:00-6:00            Friday 8:00-5:00                              Friday 8:00-5:00                              Friday 8:00-5:00    [facility] Hours:   [address] [place] [address]  [phone]    Monday 9:00-6:00  Tuesday 9:00-6:00  Wednesday 9:00-6:00  Thursday 9:00-6:00  Friday 9:00-5:00  As always, Thank you for trusting us with your health care needs!

## 2025-07-22 NOTE — PROGRESS NOTES
Chief Complaint   Patient presents with    Annual Eye Exam     Refill dry eye drops/Xiidra         Chief Complaint(s) and History of Present Illness(es)       Diabetic Eye Exam              Diabetes Type: Type 2 and controlled with diet    Duration: 20    Blood Sugars: is controlled                   Lab Results   Component Value Date    A1C 5.4 06/25/2025    A1C 5.5 05/21/2024    A1C 5.4 11/08/2023    A1C 5.3 11/17/2016    A1C 5.6 04/13/2016    A1C 5.9 08/17/2015    A1C 7.3 05/21/2015    A1C 7.1 02/20/2015     Last Eye Exam: 3-  Dilated Previously: Yes    What are you currently using to see?  glasses    Distance Vision Acuity: Satisfied with vision    Near Vision Acuity: Satisfied with vision while reading  with glasses    Eye Comfort: itchy all the time,drops really help 2-3 times a day,   Do you use eye drops? : Yes: Xiidra needs refill  Occupation or Hobbies: computer work    Malissa Mccauley Optometric Assistant, A.B.O.C.     Medical, surgical and family histories reviewed and updated 7/22/2025.       OBJECTIVE: See Ophthalmology exam    ASSESSMENT:    ICD-10-CM    1. Examination of eyes and vision  Z01.00 EYE EXAM (SIMPLE-NONBILLABLE)      2. Dry eye syndrome of both eyes  H04.123 EYE EXAM (SIMPLE-NONBILLABLE)     lifitegrast (XIIDRA) 5 % opthalmic solution      3. H/O diabetes mellitus  Z86.39 EYE EXAM (SIMPLE-NONBILLABLE)      4. Hx of LASIK  Z98.890 EYE EXAM (SIMPLE-NONBILLABLE)      5. Myopia of both eyes  H52.13 REFRACTION      6. Presbyopia  H52.4 REFRACTION      7. Regular astigmatism of both eyes  H52.223 REFRACTION          PLAN:    Domitila Luo aware  eye exam results will be sent to Marcos Ko  Patient Instructions   Xiidra- (Liftegras ophthalmic solution 5 %) 1 drop both eyes 2 x day.    Non preserved artificial tears- 1 drop both eyes at least 2 x day.  Lunchtime and dinnertime.    Ocusoft Hypochlor- spray solution onto cotton pad.  Close eyes and gently apply to eyelids and  eyelashes using side to side motion.  Use morning and evening.    There are not any signs of the diabetes affecting the eyes today.  It is important that you get your eyes dilated once yearly and keep good control of your diabetes.    Eyeglass prescription given.    Return in 1 year for a complete eye exam or sooner if needed.    Brayan Schultz, OD

## 2025-07-22 NOTE — LETTER
7/22/2025      Domitila Luo  [address] [place] [address]      Dear Colleague,    Thank you for referring your patient, Domitila Luo, to the [facility]. Please see a copy of my visit note below.    Chief Complaint   Patient presents with     Annual Eye Exam     Refill dry eye drops/Xiidra         Chief Complaint(s) and History of Present Illness(es)       Diabetic Eye Exam              Diabetes Type: Type 2 and controlled with diet    Duration: 20    Blood Sugars: is controlled                   Lab Results   Component Value Date    A1C 5.4 06/25/2025    A1C 5.5 05/21/2024    A1C 5.4 11/08/2023    A1C 5.3 11/17/2016    A1C 5.6 04/13/2016    A1C 5.9 08/17/2015    A1C 7.3 05/21/2015    A1C 7.1 02/20/2015     Last Eye Exam: 3-  Dilated Previously: Yes    What are you currently using to see?  glasses    Distance Vision Acuity: Satisfied with vision    Near Vision Acuity: Satisfied with vision while reading  with glasses    Eye Comfort: itchy all the time,drops really help 2-3 times a day,   Do you use eye drops? : Yes: Xiidra needs refill  Occupation or Hobbies: computer work    Malissa Mccauley Optometric Assistant, A.B.O.C.     Medical, surgical and family histories reviewed and updated 7/22/2025.       OBJECTIVE: See Ophthalmology exam    ASSESSMENT:    ICD-10-CM    1. Examination of eyes and vision  Z01.00 EYE EXAM (SIMPLE-NONBILLABLE)      2. Dry eye syndrome of both eyes  H04.123 EYE EXAM (SIMPLE-NONBILLABLE)     lifitegrast (XIIDRA) 5 % opthalmic solution      3. H/O diabetes mellitus  Z86.39 EYE EXAM (SIMPLE-NONBILLABLE)      4. Hx of LASIK  Z98.890 EYE EXAM (SIMPLE-NONBILLABLE)      5. Myopia of both eyes  H52.13 REFRACTION      6. Presbyopia  H52.4 REFRACTION      7. Regular astigmatism of both eyes  H52.223 REFRACTION          PLAN:    Domitila Luo aware  eye exam results will be sent to Marcos Ko  Patient Instructions   Xiidra- (Liftegras ophthalmic  solution 5 %) 1 drop both eyes 2 x day.    Non preserved artificial tears- 1 drop both eyes at least 2 x day.  Lunchtime and dinnertime.    Ocusoft Hypochlor- spray solution onto cotton pad.  Close eyes and gently apply to eyelids and eyelashes using side to side motion.  Use morning and evening.    There are not any signs of the diabetes affecting the eyes today.  It is important that you get your eyes dilated once yearly and keep good control of your diabetes.    Eyeglass prescription given.    Return in 1 year for a complete eye exam or sooner if needed.    Brayan Schultz, OD                      Again, thank you for allowing me to participate in the care of your patient.        Sincerely,        Brayan Schultz, OD    Electronically signed

## (undated) RX ORDER — FENTANYL CITRATE 50 UG/ML
INJECTION, SOLUTION INTRAMUSCULAR; INTRAVENOUS
Status: DISPENSED
Start: 2024-02-01